# Patient Record
Sex: FEMALE | Race: WHITE | NOT HISPANIC OR LATINO | Employment: FULL TIME | URBAN - METROPOLITAN AREA
[De-identification: names, ages, dates, MRNs, and addresses within clinical notes are randomized per-mention and may not be internally consistent; named-entity substitution may affect disease eponyms.]

---

## 2017-08-31 ENCOUNTER — OFFICE VISIT (OUTPATIENT)
Dept: INTERNAL MEDICINE | Facility: CLINIC | Age: 52
End: 2017-08-31
Payer: COMMERCIAL

## 2017-08-31 ENCOUNTER — TELEPHONE (OUTPATIENT)
Dept: INTERNAL MEDICINE | Facility: CLINIC | Age: 52
End: 2017-08-31

## 2017-08-31 VITALS
HEART RATE: 82 BPM | BODY MASS INDEX: 30.28 KG/M2 | WEIGHT: 170.88 LBS | SYSTOLIC BLOOD PRESSURE: 123 MMHG | TEMPERATURE: 99 F | DIASTOLIC BLOOD PRESSURE: 81 MMHG | HEIGHT: 63 IN | RESPIRATION RATE: 16 BRPM

## 2017-08-31 DIAGNOSIS — Z00.00 ANNUAL PHYSICAL EXAM: Primary | ICD-10-CM

## 2017-08-31 DIAGNOSIS — J32.9 VIRAL SINUSITIS: ICD-10-CM

## 2017-08-31 DIAGNOSIS — E03.4 HYPOTHYROIDISM DUE TO ACQUIRED ATROPHY OF THYROID: Primary | ICD-10-CM

## 2017-08-31 DIAGNOSIS — B97.89 VIRAL SINUSITIS: ICD-10-CM

## 2017-08-31 PROCEDURE — 96372 THER/PROPH/DIAG INJ SC/IM: CPT | Mod: S$GLB,,, | Performed by: INTERNAL MEDICINE

## 2017-08-31 PROCEDURE — 99214 OFFICE O/P EST MOD 30 MIN: CPT | Mod: 25,S$GLB,, | Performed by: INTERNAL MEDICINE

## 2017-08-31 PROCEDURE — 99999 PR PBB SHADOW E&M-EST. PATIENT-LVL III: CPT | Mod: PBBFAC,,, | Performed by: INTERNAL MEDICINE

## 2017-08-31 PROCEDURE — 3074F SYST BP LT 130 MM HG: CPT | Mod: S$GLB,,, | Performed by: INTERNAL MEDICINE

## 2017-08-31 PROCEDURE — 3079F DIAST BP 80-89 MM HG: CPT | Mod: S$GLB,,, | Performed by: INTERNAL MEDICINE

## 2017-08-31 PROCEDURE — 3008F BODY MASS INDEX DOCD: CPT | Mod: S$GLB,,, | Performed by: INTERNAL MEDICINE

## 2017-08-31 RX ORDER — TRIAMCINOLONE ACETONIDE 40 MG/ML
40 INJECTION, SUSPENSION INTRA-ARTICULAR; INTRAMUSCULAR
Status: COMPLETED | OUTPATIENT
Start: 2017-08-31 | End: 2017-08-31

## 2017-08-31 RX ORDER — LEVOTHYROXINE SODIUM 50 UG/1
50 TABLET ORAL DAILY
Qty: 90 TABLET | Refills: 3 | Status: SHIPPED | OUTPATIENT
Start: 2017-08-31 | End: 2017-11-09 | Stop reason: SDUPTHER

## 2017-08-31 RX ORDER — METHYLPREDNISOLONE 4 MG/1
TABLET ORAL
Qty: 1 PACKAGE | Refills: 0 | Status: SHIPPED | OUTPATIENT
Start: 2017-08-31 | End: 2017-11-09 | Stop reason: ALTCHOICE

## 2017-08-31 RX ORDER — FLUTICASONE PROPIONATE 50 MCG
2 SPRAY, SUSPENSION (ML) NASAL DAILY
Qty: 16 G | Refills: 2 | Status: SHIPPED | OUTPATIENT
Start: 2017-08-31 | End: 2017-09-30

## 2017-08-31 RX ADMIN — TRIAMCINOLONE ACETONIDE 40 MG: 40 INJECTION, SUSPENSION INTRA-ARTICULAR; INTRAMUSCULAR at 02:08

## 2017-08-31 NOTE — PROGRESS NOTES
Subjective:       Patient ID: Tanesha Burns is a 52 y.o. female.    Chief Complaint: Medication Refill    HPI   Pt with hypothyroidism is here for f/u. She is requesting refills of her synthroid.    Pt also c/o 2 weeks of persistent sinus congestion, post nasal drip, sore throat, fevers/chills initially. Minimal relief with OTC medications.   Review of Systems   Constitutional: Positive for chills and fever. Negative for activity change, appetite change, diaphoresis, fatigue and unexpected weight change.   HENT: Positive for congestion, postnasal drip, rhinorrhea and sinus pressure. Negative for sneezing, sore throat, trouble swallowing and voice change.    Respiratory: Negative for cough, shortness of breath and wheezing.    Cardiovascular: Negative for chest pain, palpitations and leg swelling.   Gastrointestinal: Negative for abdominal pain, blood in stool, constipation, diarrhea, nausea and vomiting.   Genitourinary: Negative for dysuria.   Musculoskeletal: Negative for arthralgias and myalgias.   Skin: Negative for rash and wound.   Allergic/Immunologic: Negative for environmental allergies and food allergies.   Hematological: Negative for adenopathy. Does not bruise/bleed easily.       Objective:      Physical Exam   Constitutional: She is oriented to person, place, and time. She appears well-developed and well-nourished. No distress.   HENT:   Head: Normocephalic and atraumatic.   Right Ear: External ear normal.   Left Ear: External ear normal.   Nose: Mucosal edema and rhinorrhea present.   Mouth/Throat: Oropharynx is clear and moist. No oropharyngeal exudate.   Eyes: Conjunctivae and EOM are normal. Pupils are equal, round, and reactive to light. Right eye exhibits no discharge. Left eye exhibits no discharge. No scleral icterus.   Neck: Neck supple. No JVD present.   Cardiovascular: Normal rate, regular rhythm, normal heart sounds and intact distal pulses.    Pulmonary/Chest: Effort normal and breath  sounds normal. No respiratory distress. She has no wheezes. She has no rales.   Abdominal: Soft. Bowel sounds are normal. There is no tenderness.   Musculoskeletal: She exhibits no edema.   Lymphadenopathy:     She has no cervical adenopathy.   Neurological: She is alert and oriented to person, place, and time.   Skin: Skin is warm and dry. No rash noted. She is not diaphoretic. No pallor.       Assessment:       1. Hypothyroidism due to acquired atrophy of thyroid    2. Viral sinusitis        Plan:    1. Stable, refill Synthroid    2. Rx Medrol Pack/Flonase, Kenalog 40 mg IM and continue antihistamine

## 2017-08-31 NOTE — TELEPHONE ENCOUNTER
----- Message from Mari Lowe sent at 8/31/2017 11:03 AM CDT -----  Contact: self/  Doctor appointment and lab have been scheduled.  Please link lab orders to the lab appointment.  Date of doctor appointment:  11/9/17  Physical or EP:  phys  Date of lab appointment:  11/2/17  Comments:

## 2017-11-02 ENCOUNTER — LAB VISIT (OUTPATIENT)
Dept: LAB | Facility: HOSPITAL | Age: 52
End: 2017-11-02
Attending: INTERNAL MEDICINE
Payer: COMMERCIAL

## 2017-11-02 DIAGNOSIS — Z00.00 ANNUAL PHYSICAL EXAM: ICD-10-CM

## 2017-11-02 LAB
ALBUMIN SERPL BCP-MCNC: 3.5 G/DL
ALP SERPL-CCNC: 92 U/L
ALT SERPL W/O P-5'-P-CCNC: 8 U/L
ANION GAP SERPL CALC-SCNC: 9 MMOL/L
AST SERPL-CCNC: 12 U/L
BASOPHILS # BLD AUTO: 0.01 K/UL
BASOPHILS NFR BLD: 0.1 %
BILIRUB SERPL-MCNC: 0.7 MG/DL
BUN SERPL-MCNC: 9 MG/DL
CALCIUM SERPL-MCNC: 9.3 MG/DL
CHLORIDE SERPL-SCNC: 107 MMOL/L
CHOLEST SERPL-MCNC: 159 MG/DL
CHOLEST/HDLC SERPL: 3.5 {RATIO}
CO2 SERPL-SCNC: 25 MMOL/L
CREAT SERPL-MCNC: 0.9 MG/DL
DIFFERENTIAL METHOD: ABNORMAL
EOSINOPHIL # BLD AUTO: 0.2 K/UL
EOSINOPHIL NFR BLD: 2.5 %
ERYTHROCYTE [DISTWIDTH] IN BLOOD BY AUTOMATED COUNT: 12.6 %
EST. GFR  (AFRICAN AMERICAN): >60 ML/MIN/1.73 M^2
EST. GFR  (NON AFRICAN AMERICAN): >60 ML/MIN/1.73 M^2
GLUCOSE SERPL-MCNC: 100 MG/DL
HCT VFR BLD AUTO: 39 %
HDLC SERPL-MCNC: 46 MG/DL
HDLC SERPL: 28.9 %
HGB BLD-MCNC: 13.2 G/DL
IMM GRANULOCYTES # BLD AUTO: 0.04 K/UL
IMM GRANULOCYTES NFR BLD AUTO: 0.6 %
LDLC SERPL CALC-MCNC: 99.8 MG/DL
LYMPHOCYTES # BLD AUTO: 1.5 K/UL
LYMPHOCYTES NFR BLD: 21.5 %
MCH RBC QN AUTO: 31.5 PG
MCHC RBC AUTO-ENTMCNC: 33.8 G/DL
MCV RBC AUTO: 93 FL
MONOCYTES # BLD AUTO: 0.6 K/UL
MONOCYTES NFR BLD: 8.4 %
NEUTROPHILS # BLD AUTO: 4.6 K/UL
NEUTROPHILS NFR BLD: 66.9 %
NONHDLC SERPL-MCNC: 113 MG/DL
NRBC BLD-RTO: 0 /100 WBC
PLATELET # BLD AUTO: 173 K/UL
PMV BLD AUTO: 11.7 FL
POTASSIUM SERPL-SCNC: 3.9 MMOL/L
PROT SERPL-MCNC: 6.8 G/DL
RBC # BLD AUTO: 4.19 M/UL
SODIUM SERPL-SCNC: 141 MMOL/L
T4 FREE SERPL-MCNC: 1.04 NG/DL
TRIGL SERPL-MCNC: 66 MG/DL
TSH SERPL DL<=0.005 MIU/L-ACNC: 2.52 UIU/ML
WBC # BLD AUTO: 6.93 K/UL

## 2017-11-02 PROCEDURE — 80061 LIPID PANEL: CPT

## 2017-11-02 PROCEDURE — 80053 COMPREHEN METABOLIC PANEL: CPT

## 2017-11-02 PROCEDURE — 84439 ASSAY OF FREE THYROXINE: CPT

## 2017-11-02 PROCEDURE — 85025 COMPLETE CBC W/AUTO DIFF WBC: CPT

## 2017-11-02 PROCEDURE — 36415 COLL VENOUS BLD VENIPUNCTURE: CPT | Mod: PO

## 2017-11-02 PROCEDURE — 84443 ASSAY THYROID STIM HORMONE: CPT

## 2017-11-09 ENCOUNTER — OFFICE VISIT (OUTPATIENT)
Dept: INTERNAL MEDICINE | Facility: CLINIC | Age: 52
End: 2017-11-09
Payer: COMMERCIAL

## 2017-11-09 VITALS
HEART RATE: 64 BPM | TEMPERATURE: 98 F | HEIGHT: 63 IN | BODY MASS INDEX: 28.9 KG/M2 | SYSTOLIC BLOOD PRESSURE: 118 MMHG | RESPIRATION RATE: 16 BRPM | DIASTOLIC BLOOD PRESSURE: 70 MMHG | WEIGHT: 163.13 LBS

## 2017-11-09 DIAGNOSIS — K63.5 POLYP OF COLON, UNSPECIFIED PART OF COLON, UNSPECIFIED TYPE: ICD-10-CM

## 2017-11-09 DIAGNOSIS — E03.4 HYPOTHYROIDISM DUE TO ACQUIRED ATROPHY OF THYROID: ICD-10-CM

## 2017-11-09 DIAGNOSIS — Z00.00 ANNUAL PHYSICAL EXAM: Primary | ICD-10-CM

## 2017-11-09 PROCEDURE — 99396 PREV VISIT EST AGE 40-64: CPT | Mod: S$GLB,,, | Performed by: INTERNAL MEDICINE

## 2017-11-09 PROCEDURE — 99999 PR PBB SHADOW E&M-EST. PATIENT-LVL III: CPT | Mod: PBBFAC,,, | Performed by: INTERNAL MEDICINE

## 2017-11-09 RX ORDER — LEVOTHYROXINE SODIUM 50 UG/1
50 TABLET ORAL DAILY
Qty: 90 TABLET | Refills: 3 | Status: SHIPPED | OUTPATIENT
Start: 2017-11-09 | End: 2017-11-09 | Stop reason: SDUPTHER

## 2017-11-09 RX ORDER — LEVOTHYROXINE SODIUM 50 UG/1
50 TABLET ORAL DAILY
Qty: 90 TABLET | Refills: 3 | Status: SHIPPED | OUTPATIENT
Start: 2017-11-09 | End: 2018-03-24 | Stop reason: SDUPTHER

## 2017-11-09 NOTE — PROGRESS NOTES
Subjective:       Patient ID: Tanesha Burns is a 52 y.o. female.    Chief Complaint: Annual Exam (with labs to review)    HPI   52 y.o. Female here for annual exam.      Cholesterol: (normal)  Vaccines: Influenza (declined); Tetanus (declined)  Eye exam: 2016  Mammogram: 2/16  Gyn exam: 2016  Colonoscopy: 11/13- repeat in 2018     Exercise: no  Diet: regular     Past Medical History:    Hysterectomy     Abnormal Pap smear                              1983            Comment:cryo    Colon polyps                                                  Encounter for blood transfusion                               History of abnormal Pap smear                                   Comment:s/p cryo at age 16    Hypothyroid                                                   Psoriasis                                                   Past Surgical History:    TEMPOROMANDIBULAR JOINT SURGERY                                BUNIONECTOMY                                                     Comment:bilat    RETINAL LASER PROCEDURE                                        TUBAL LIGATION                                   2/09          CRYOTHERAPY                                                    FOREARM FRACTURE SURGERY                                       WRIST SURGERY                                   Left               HYSTERECTOMY                                                 Social History    Marital status:             Spouse name:                       Years of education:                 Number of children: 2              Occupational History  Occupation          Employer            Comment                                             Social History Main Topics    Smoking status: Never Smoker                                                                    Smokeless status: Not on file                       Alcohol use: No              Drug use: No              Sexual activity: Yes               Partners  with: Male     No Known Allergies  Ms. Burns does not currently have medications on file.  Review of Systems   Constitutional: Negative for activity change, appetite change, chills, diaphoresis, fatigue, fever and unexpected weight change.   HENT: Negative for congestion, mouth sores, postnasal drip, rhinorrhea, sinus pressure, sneezing, sore throat, trouble swallowing and voice change.    Eyes: Negative for pain, discharge and visual disturbance.   Respiratory: Negative for cough, shortness of breath and wheezing.    Cardiovascular: Negative for chest pain, palpitations and leg swelling.   Gastrointestinal: Negative for abdominal pain, blood in stool, constipation, diarrhea, nausea and vomiting.   Endocrine: Negative for cold intolerance and heat intolerance.   Genitourinary: Negative for difficulty urinating, dysuria, frequency, hematuria and urgency.   Musculoskeletal: Negative for arthralgias and myalgias.   Skin: Negative for rash and wound.   Allergic/Immunologic: Negative for environmental allergies and food allergies.   Neurological: Negative for dizziness, tremors, seizures, syncope, weakness, light-headedness and headaches.   Hematological: Negative for adenopathy. Does not bruise/bleed easily.   Psychiatric/Behavioral: Negative for confusion and sleep disturbance. The patient is not nervous/anxious.        Objective:      Physical Exam   Constitutional: She is oriented to person, place, and time. She appears well-developed and well-nourished. No distress.   HENT:   Head: Normocephalic and atraumatic.   Right Ear: External ear normal.   Left Ear: External ear normal.   Nose: Nose normal.   Mouth/Throat: Oropharynx is clear and moist. No oropharyngeal exudate.   Eyes: Conjunctivae and EOM are normal. Pupils are equal, round, and reactive to light. Right eye exhibits no discharge. Left eye exhibits no discharge. No scleral icterus.   Neck: Neck supple. No JVD present. No thyromegaly present.    Cardiovascular: Normal rate, regular rhythm, normal heart sounds and intact distal pulses.    No murmur heard.  Pulmonary/Chest: Effort normal and breath sounds normal. No respiratory distress. She has no wheezes. She has no rales. She exhibits no tenderness.   Abdominal: Soft. Bowel sounds are normal. She exhibits no distension. There is no tenderness. There is no guarding.   Musculoskeletal: She exhibits no edema.   Lymphadenopathy:     She has no cervical adenopathy.   Neurological: She is alert and oriented to person, place, and time. No cranial nerve deficit. Coordination normal.   Skin: Skin is warm and dry. No rash noted. She is not diaphoretic. No pallor.   Psychiatric: She has a normal mood and affect. Judgment normal.   Nursing note and vitals reviewed.      Assessment:       1. Annual physical exam    2. Hypothyroidism due to acquired atrophy of thyroid    3. Polyp of colon, unspecified part of colon, unspecified type        Plan:    1. Blood work reviewed with pt       Vaccines: Influenza (declined); Tetanus (declined)       Eye exam: 2016       Mammogram: 2/16       Gyn exam: 2016       Colonoscopy: 11/13- repeat in 2018   2. Hypothyroidism- controlled on Synthroid   3. Colon polyps- needs repeat Colonoscopy in 2018   4. F/u in 1 yr or PRN

## 2017-12-11 ENCOUNTER — TELEPHONE (OUTPATIENT)
Dept: OBSTETRICS AND GYNECOLOGY | Facility: CLINIC | Age: 52
End: 2017-12-11

## 2017-12-11 DIAGNOSIS — Z12.31 VISIT FOR SCREENING MAMMOGRAM: Primary | ICD-10-CM

## 2017-12-27 ENCOUNTER — HOSPITAL ENCOUNTER (OUTPATIENT)
Dept: RADIOLOGY | Facility: HOSPITAL | Age: 52
Discharge: HOME | End: 2017-12-27
Attending: OBSTETRICS & GYNECOLOGY
Payer: COMMERCIAL

## 2017-12-27 DIAGNOSIS — Z12.31 VISIT FOR SCREENING MAMMOGRAM: ICD-10-CM

## 2017-12-27 PROCEDURE — 77063 BREAST TOMOSYNTHESIS BI: CPT | Mod: 26,,, | Performed by: RADIOLOGY

## 2017-12-27 PROCEDURE — 77067 SCR MAMMO BI INCL CAD: CPT | Mod: 26,,, | Performed by: RADIOLOGY

## 2017-12-27 PROCEDURE — 77067 SCR MAMMO BI INCL CAD: CPT | Mod: TC,PO

## 2018-03-24 DIAGNOSIS — E03.4 HYPOTHYROIDISM DUE TO ACQUIRED ATROPHY OF THYROID: ICD-10-CM

## 2018-03-26 RX ORDER — LEVOTHYROXINE SODIUM 50 UG/1
TABLET ORAL
Qty: 90 TABLET | Refills: 3 | Status: SHIPPED | OUTPATIENT
Start: 2018-03-26 | End: 2019-03-11 | Stop reason: SDUPTHER

## 2018-07-26 ENCOUNTER — TELEPHONE (OUTPATIENT)
Dept: INTERNAL MEDICINE | Facility: CLINIC | Age: 53
End: 2018-07-26

## 2018-07-26 DIAGNOSIS — Z80.0 FAMILY HISTORY OF COLON CANCER: ICD-10-CM

## 2018-07-26 DIAGNOSIS — Z12.11 COLON CANCER SCREENING: Primary | ICD-10-CM

## 2018-07-26 NOTE — TELEPHONE ENCOUNTER
Spoke with pt re: Colonoscopy order placed. Now it takes about 2 weeks for them to call her to schedule the appt. Pt then can tell them what date she wants.    Pt verbalized understanding

## 2018-07-26 NOTE — TELEPHONE ENCOUNTER
----- Message from Mari Lowe sent at 7/26/2018 11:27 AM CDT -----  Contact: self/ [phone]  Patient would like a colonoscopy. She has one every five years due to family history. Please call and advise.    Patient would like to have this done 11/9/2018.

## 2018-09-28 ENCOUNTER — PATIENT MESSAGE (OUTPATIENT)
Dept: ENDOSCOPY | Facility: HOSPITAL | Age: 53
End: 2018-09-28

## 2018-09-28 DIAGNOSIS — Z12.11 SPECIAL SCREENING FOR MALIGNANT NEOPLASMS, COLON: Primary | ICD-10-CM

## 2018-09-28 RX ORDER — SODIUM, POTASSIUM,MAG SULFATES 17.5-3.13G
SOLUTION, RECONSTITUTED, ORAL ORAL
Qty: 354 ML | Refills: 0 | Status: SHIPPED | OUTPATIENT
Start: 2018-09-28 | End: 2019-04-10 | Stop reason: ALTCHOICE

## 2018-12-07 ENCOUNTER — ANESTHESIA (OUTPATIENT)
Dept: ENDOSCOPY | Facility: HOSPITAL | Age: 53
End: 2018-12-07
Payer: COMMERCIAL

## 2018-12-07 ENCOUNTER — HOSPITAL ENCOUNTER (OUTPATIENT)
Facility: HOSPITAL | Age: 53
Discharge: HOME | End: 2018-12-07
Attending: INTERNAL MEDICINE | Admitting: INTERNAL MEDICINE
Payer: COMMERCIAL

## 2018-12-07 ENCOUNTER — ANESTHESIA EVENT (OUTPATIENT)
Dept: ENDOSCOPY | Facility: HOSPITAL | Age: 53
End: 2018-12-07
Payer: COMMERCIAL

## 2018-12-07 VITALS
RESPIRATION RATE: 18 BRPM | HEART RATE: 76 BPM | TEMPERATURE: 98 F | SYSTOLIC BLOOD PRESSURE: 112 MMHG | DIASTOLIC BLOOD PRESSURE: 66 MMHG | OXYGEN SATURATION: 99 %

## 2018-12-07 DIAGNOSIS — K63.5 COLON POLYPS: ICD-10-CM

## 2018-12-07 DIAGNOSIS — K63.5 POLYP OF COLON, UNSPECIFIED PART OF COLON, UNSPECIFIED TYPE: Primary | ICD-10-CM

## 2018-12-07 PROCEDURE — E9220 PRA ENDO ANESTHESIA: HCPCS | Mod: 33,,, | Performed by: NURSE ANESTHETIST, CERTIFIED REGISTERED

## 2018-12-07 PROCEDURE — 45380 COLONOSCOPY AND BIOPSY: CPT | Mod: 33,,, | Performed by: INTERNAL MEDICINE

## 2018-12-07 PROCEDURE — 27201012 HC FORCEPS, HOT/COLD, DISP: Performed by: INTERNAL MEDICINE

## 2018-12-07 PROCEDURE — 45380 COLONOSCOPY AND BIOPSY: CPT | Performed by: INTERNAL MEDICINE

## 2018-12-07 PROCEDURE — 88305 TISSUE EXAM BY PATHOLOGIST: CPT | Performed by: PATHOLOGY

## 2018-12-07 PROCEDURE — 63600175 PHARM REV CODE 636 W HCPCS: Performed by: NURSE ANESTHETIST, CERTIFIED REGISTERED

## 2018-12-07 PROCEDURE — 37000009 HC ANESTHESIA EA ADD 15 MINS: Performed by: INTERNAL MEDICINE

## 2018-12-07 PROCEDURE — 88305 TISSUE EXAM BY PATHOLOGIST: CPT | Mod: 26,,, | Performed by: PATHOLOGY

## 2018-12-07 PROCEDURE — 37000008 HC ANESTHESIA 1ST 15 MINUTES: Performed by: INTERNAL MEDICINE

## 2018-12-07 PROCEDURE — 25000003 PHARM REV CODE 250: Performed by: INTERNAL MEDICINE

## 2018-12-07 RX ORDER — SODIUM CHLORIDE 9 MG/ML
INJECTION, SOLUTION INTRAVENOUS CONTINUOUS
Status: DISCONTINUED | OUTPATIENT
Start: 2018-12-07 | End: 2018-12-07 | Stop reason: HOSPADM

## 2018-12-07 RX ORDER — LIDOCAINE HCL/PF 100 MG/5ML
SYRINGE (ML) INTRAVENOUS
Status: DISCONTINUED | OUTPATIENT
Start: 2018-12-07 | End: 2018-12-07

## 2018-12-07 RX ORDER — PROPOFOL 10 MG/ML
INJECTION, EMULSION INTRAVENOUS CONTINUOUS PRN
Status: DISCONTINUED | OUTPATIENT
Start: 2018-12-07 | End: 2018-12-07

## 2018-12-07 RX ORDER — PROPOFOL 10 MG/ML
INJECTION, EMULSION INTRAVENOUS
Status: DISCONTINUED | OUTPATIENT
Start: 2018-12-07 | End: 2018-12-07

## 2018-12-07 RX ORDER — SODIUM CHLORIDE 0.9 % (FLUSH) 0.9 %
3 SYRINGE (ML) INJECTION
Status: DISCONTINUED | OUTPATIENT
Start: 2018-12-07 | End: 2018-12-07 | Stop reason: HOSPADM

## 2018-12-07 RX ADMIN — PROPOFOL 80 MG: 10 INJECTION, EMULSION INTRAVENOUS at 07:12

## 2018-12-07 RX ADMIN — LIDOCAINE HYDROCHLORIDE 50 MG: 20 INJECTION, SOLUTION INTRAVENOUS at 07:12

## 2018-12-07 RX ADMIN — PROPOFOL 50 MG: 10 INJECTION, EMULSION INTRAVENOUS at 07:12

## 2018-12-07 RX ADMIN — SODIUM CHLORIDE: 0.9 INJECTION, SOLUTION INTRAVENOUS at 07:12

## 2018-12-07 RX ADMIN — PROPOFOL 200 MCG/KG/MIN: 10 INJECTION, EMULSION INTRAVENOUS at 07:12

## 2018-12-07 NOTE — DISCHARGE INSTRUCTIONS
Colonoscopy     A camera attached to a flexible tube with a viewing lens is used to take video pictures.     Colonoscopy is a test to view the inside of your lower digestive tract (colon and rectum). Sometimes it can show the last part of the small intestine (ileum). During the test, small pieces of tissue may be removed for testing. This is called a biopsy. Small growths, such as polyps, may also be removed.   Why is colonoscopy done?  The test is done to help look for colon cancer. And it can help find the source of abdominal pain, bleeding, and changes in bowel habits. It may be needed once a year, depending on factors such as your:  · Age  · Health history  · Family health history  · Symptoms  · Results from any prior colonoscopy  Risks and possible complications  These include:  · Bleeding               · A puncture or tear in the colon   · Risks of anesthesia  · A cancer lesion not being seen  Getting ready   To prepare for the test:  · Talk with your healthcare provider about the risks of the test (see below). Also ask your healthcare provider about alternatives to the test.  · Tell your healthcare provider about any medicines you take. Also tell him or her about any health conditions you may have.  · Make sure your rectum and colon are empty for the test. Follow the diet and bowel prep instructions exactly. If you dont, the test may need to be rescheduled.  · Plan for a friend or family member to drive you home after the test.     Colonoscopy provides an inside view of the entire colon.     You may discuss the results with your doctor right away or at a future visit.  During the test   The test is usually done in the hospital on an outpatient basis. This means you go home the same day. The procedure takes about 30 minutes. During that time:  · You are given relaxing (sedating) medicine through an IV line. You may be drowsy, or fully asleep.  · The healthcare provider will first give you a physical exam to  check for anal and rectal problems.  · Then the anus is lubricated and the scope inserted.  · If you are awake, you may have a feeling similar to needing to have a bowel movement. You may also feel pressure as air is pumped into the colon. Its OK to pass gas during the procedure.  · Biopsy, polyp removal, or other treatments may be done during the test.  After the test   You may have gas right after the test. It can help to try to pass it to help prevent later bloating. Your healthcare provider may discuss the results with you right away. Or you may need to schedule a follow-up visit to talk about the results. After the test, you can go back to your normal eating and other activities. You may be tired from the sedation and need to rest for a few hours.  Date Last Reviewed: 11/1/2016 © 2000-2017 [employer]. [address] [place] [address], [place] [address]. All rights reserved. This information is not intended as a substitute for professional medical care. Always follow your healthcare professional's instructions.

## 2018-12-07 NOTE — H&P
Short Stay Endoscopy History and Physical    PCP - Pedro Carrera DO  Referring Physician - Pedro Carrera DO  [address] [facility]  [place] [id number]    Procedure - Colonoscopy  ASA - per anesthesia  Mallampati - per anesthesia  History of Anesthesia problems - see anesthesia note  Family history Anesthesia problems -  see anesthesia note  Plan of anesthesia - as per anesthesia    HPI  53 y.o. female    Reason for procedure:  previous polyps, family history of CRC, father had CRC at age 60s.     Abdominal/epigastric pain: No  Reflux: No   Dysphagia: No  Change in bowel habits: No    ROS:  Constitutional: No fevers, chills  CV: No chest pain  Pulm: No shortness of breath  GI: see HPI    Medical History:  has a past medical history of Abnormal Pap smear (1983), Colon polyps, Encounter for blood transfusion, History of abnormal Pap smear, Hypothyroid, and Psoriasis.    Surgical History:  has a past surgical history that includes Temporomandibular joint surgery; Bunionectomy; Retinal laser procedure; Tubal ligation (2/09); Cryotherapy; Forearm fracture surgery; Wrist surgery (Left); Hysterectomy; LAPAROSCOPIC - UTEROSACRAL SUSPENSION (N/A, 3/7/2016); CYSTOSCOPY (N/A, 3/7/2016); SALPINGECTOMY-ROBOTIC ASSISTED (N/A, 3/7/2016); EXCISION-SKIN--L labial bulge (Left, 3/7/2016); ROBOTIC ASSISTED LAPAROSCOPIC HYSTERECTOMY (N/A, 3/7/2016); PERINEORRHAPHY POSTERIOR (3/7/2016); ROBOTIC ASSISTED LAPAROSCOPIC HYSTERECTOMY (N/A, 3/7/2016); and COLONOSCOPY (N/A, 11/25/2013).    Family History: family history includes Breast cancer (age of onset: 90) in her maternal grandmother; Cancer (age of onset: 65) in her father; Colon cancer in her father; Coronary artery disease (age of onset: 60) in her father; Diabetes in her maternal grandfather; Hypertension in her father; Liver cancer in her father; Prostate cancer in her father.    Social History:  reports that  has never smoked. she has never used smokeless tobacco.  She reports that she does not drink alcohol or use drugs.    Review of patient's allergies indicates:  No Known Allergies    Medications:   Medications Prior to Admission   Medication Sig Dispense Refill Last Dose    SYNTHROID 50 mcg tablet TAKE 1 TABLET DAILY 90 tablet 3 12/6/2018 at Unknown time    sodium,potassium,mag sulfates (SUPREP BOWEL PREP KIT) 17.5-3.13-1.6 gram SolR As directed. 354 mL 0        Physical Exam:    Vital Signs: There were no vitals filed for this visit.    General Appearance: Well appearing in no acute distress  Lungs: No respiratory distress.   Heart:  Regular rate, S1, S2 normal.  Abdomen: Soft, non tender, non distended with normal bowel sounds, no masses    Labs:  Lab Results   Component Value Date    WBC 6.93 11/02/2017    HGB 13.2 11/02/2017    HCT 39.0 11/02/2017    MCV 93 11/02/2017     11/02/2017     Lab Results   Component Value Date    INR 1.0 03/03/2016     No results found for: IRON, FERRITIN, TIBC, FESATURATED  Lab Results   Component Value Date     11/02/2017    K 3.9 11/02/2017     11/02/2017    CO2 25 11/02/2017    BUN 9 11/02/2017    CREATININE 0.9 11/02/2017       I have explained the risks and benefits of this endoscopic procedure to the patient/family including but not limited to missed polyp, missed CRC, bleeding, inflammation, infection, perforation, hypoxia/respiratory failure, and death.       Melany Cho MD  Gastroenterology & Hepatology Fellow  Pager: [phone]

## 2018-12-07 NOTE — TRANSFER OF CARE
Anesthesia Transfer of Care Note    Patient: Tanesha Burns    Procedure(s) Performed: Procedure(s) (LRB):  COLONOSCOPY (N/A)    Patient location: GI    Anesthesia Type: general    Transport from OR: Transported from OR on room air with adequate spontaneous ventilation    Post pain: adequate analgesia    Post assessment: no apparent anesthetic complications and tolerated procedure well    Post vital signs: stable    Level of consciousness: awake    Nausea/Vomiting: no nausea/vomiting    Complications: none    Transfer of care protocol was followed      Last vitals:   Visit Vitals  /60 (BP Location: Left arm, Patient Position: Lying)   Pulse 77   Temp 36.6 °C (97.8 °F) (Temporal)   Resp 18   LMP 03/29/2016   SpO2 97%   Breastfeeding? No

## 2018-12-07 NOTE — [employer] PATIENT INSTRUCTIONS
Discharge Summary/Instructions after an Endoscopic Procedure  Patient Name: Tanesha Burns  Patient MRN: [id number]  Patient YOB: 1965  Friday, December 07, 2018  Evelio García MD  RESTRICTIONS:  During your procedure today, you received medications for sedation.  These   medications may affect your judgment, balance and coordination.  Therefore,   for 24 hours, you have the following restrictions:   - DO NOT drive a car, operate machinery, make legal/financial decisions,   sign important papers or drink alcohol.    ACTIVITY:  Today: no heavy lifting, straining or running due to procedural   sedation/anesthesia.  The following day: return to full activity including work.  DIET:  Eat and drink normally unless instructed otherwise.     TREATMENT FOR COMMON SIDE EFFECTS:  - Mild abdominal pain, nausea, belching, bloating or excessive gas:  rest,   eat lightly and use a heating pad.  - Sore Throat: treat with throat lozenges and/or gargle with warm salt   water.  - Because air was used during the procedure, expelling large amounts of air   from your rectum or belching is normal.  - If a bowel prep was taken, you may not have a bowel movement for 1-3 days.    This is normal.  SYMPTOMS TO WATCH FOR AND REPORT TO YOUR PHYSICIAN:  1. Abdominal pain or bloating, other than gas cramps.  2. Chest pain.  3. Back pain.  4. Signs of infection such as: chills or fever occurring within 24 hours   after the procedure.  5. Rectal bleeding, which would show as bright red, maroon, or black stools.   (A tablespoon of blood from the rectum is not serious, especially if   hemorrhoids are present.)  6. Vomiting.  7. Weakness or dizziness.  GO DIRECTLY TO THE NEAREST EMERGENCY ROOM IF YOU HAVE ANY OF THE FOLLOWING:      Difficulty breathing              Chills and/or fever over 101 F   Persistent vomiting and/or vomiting blood   Severe abdominal pain   Severe chest pain   Black, tarry stools   Bleeding- more than one  tablespoon   Any other symptom or condition that you feel may need urgent attention  Your doctor recommends these additional instructions:  If any biopsies were taken, your doctors clinic will contact you in 1 to 2   weeks with any results.  - Patient has a contact number available for emergencies.  The signs and   symptoms of potential delayed complications were discussed with the   patient.  Return to normal activities tomorrow.  Written discharge   instructions were provided to the patient.   - Discharge patient to home.   - Await pathology results.   - Repeat colonoscopy in 5 years for surveillance.   - The findings and recommendations were discussed with the designated   responsible adult.  For questions, problems or results please call your physician - Evelio García MD at Work:  [phone].  [facility], EMERGENCY ROOM PHONE NUMBER: [phone]  IF A COMPLICATION OR EMERGENCY SITUATION ARISES AND YOU ARE UNABLE TO REACH   YOUR PHYSICIAN - GO DIRECTLY TO THE EMERGENCY ROOM.  Evelio García MD  12/7/2018 7:59:37 AM  This report has been verified and signed electronically.  [employer]

## 2018-12-07 NOTE — ANESTHESIA PREPROCEDURE EVALUATION
12/07/2018  Tanesha Burns is a 53 y.o., female.    Anesthesia Evaluation    I have reviewed the Patient Summary Reports.    I have reviewed the Nursing Notes.   I have reviewed the Medications.     Review of Systems  Anesthesia Hx:  No problems with previous Anesthesia   Denies Personal Hx of Anesthesia complications.   Hematology/Oncology:  Hematology Normal   Oncology Normal     EENT/Dental:EENT/Dental Normal   Cardiovascular:  Cardiovascular Normal Exercise tolerance: good  ECG has been reviewed.    Pulmonary:  Pulmonary Normal    Renal/:  Renal/ Normal     Hepatic/GI:  Hepatic/GI Normal Bowel Prep.    Musculoskeletal:  Musculoskeletal Normal    Neurological:  Neurology Normal    Endocrine:   Hypothyroidism    Dermatological:  Skin Normal    Psych:  Psychiatric Normal           Physical Exam  General:  Well nourished    Airway/Jaw/Neck:  Airway Findings: Mouth Opening: Normal Mallampati: I  TM Distance: Normal, at least 6 cm  Jaw/Neck Findings:  Neck ROM: Normal ROM      Dental:  Dental Findings: In tact   Chest/Lungs:  Chest/Lungs Findings: Clear to auscultation, Normal Respiratory Rate     Heart/Vascular:  Heart Findings: Rate: Normal  Rhythm: Regular Rhythm  Sounds: Normal     Abdomen:  Abdomen Findings:  Normal, Soft, Nontender       Mental Status:  Mental Status Findings:  Alert and Oriented, Cooperative         Anesthesia Plan  Type of Anesthesia, risks & benefits discussed:  Anesthesia Type:  general  Patient's Preference:   Intra-op Monitoring Plan: standard ASA monitors  Intra-op Monitoring Plan Comments:   Post Op Pain Control Plan: IV/PO Opioids PRN  Post Op Pain Control Plan Comments:   Induction:   IV  Beta Blocker:  Patient is not currently on a Beta-Blocker (No further documentation required).       Informed Consent: Patient understands risks and agrees with Anesthesia plan.   Questions answered. Anesthesia consent signed with patient.  ASA Score: 1     Day of Surgery Review of History & Physical: I have interviewed and examined the patient. I have reviewed the patient's H&P dated: 12/07/18. There are no significant changes.          Ready For Surgery From Anesthesia Perspective.

## 2018-12-07 NOTE — ANESTHESIA POSTPROCEDURE EVALUATION
Anesthesia Post Evaluation    Patient: Tanesha Burns    Procedure(s) Performed: Procedure(s) (LRB):  COLONOSCOPY (N/A)    Final Anesthesia Type: general  Patient location during evaluation: PACU  Patient participation: Yes- Able to Participate  Level of consciousness: awake and alert and oriented  Post-procedure vital signs: reviewed and stable  Pain management: adequate  Airway patency: patent  PONV status at discharge: No PONV  Anesthetic complications: no      Cardiovascular status: blood pressure returned to baseline  Respiratory status: unassisted, room air and spontaneous ventilation  Hydration status: euvolemic  Follow-up not needed.        Visit Vitals  /66 (BP Location: Left arm, Patient Position: Lying)   Pulse 76   Temp 36.6 °C (97.8 °F) (Temporal)   Resp 18   LMP 03/29/2016   SpO2 99%   Breastfeeding? No       Pain/Lanny Score: Pain Assessment Performed: Yes (12/7/2018  8:31 AM)  Presence of Pain: denies (12/7/2018  8:31 AM)

## 2018-12-14 ENCOUNTER — TELEPHONE (OUTPATIENT)
Dept: ENDOSCOPY | Facility: HOSPITAL | Age: 53
End: 2018-12-14

## 2019-03-11 DIAGNOSIS — E03.4 HYPOTHYROIDISM DUE TO ACQUIRED ATROPHY OF THYROID: ICD-10-CM

## 2019-03-11 RX ORDER — LEVOTHYROXINE SODIUM 50 UG/1
TABLET ORAL
Qty: 90 TABLET | Refills: 0 | Status: SHIPPED | OUTPATIENT
Start: 2019-03-11 | End: 2019-06-08 | Stop reason: SDUPTHER

## 2019-03-11 NOTE — LETTER
March 12, 2019    Tanesha TERRENCE Burns  [address]  [place] [address]             [place] [facility]                                                                                                                                                       [address] [facility]  [place] [address]  Phone: [phone]  Fax: [phone] Dear Ms. Burns:    We recently refilled your levothyroxine. Dr. Carrera says you are due for your Annual visit w/labs for further refills. Please call to schedule @ [phone], phone staff would be happy to assist you.    If you have any questions or concerns, please don't hesitate to call.    Sincerely,    Pedro Carrera DO  cch

## 2019-04-10 ENCOUNTER — OFFICE VISIT (OUTPATIENT)
Dept: INTERNAL MEDICINE | Facility: CLINIC | Age: 54
End: 2019-04-10
Payer: COMMERCIAL

## 2019-04-10 VITALS
BODY MASS INDEX: 31.25 KG/M2 | TEMPERATURE: 98 F | OXYGEN SATURATION: 96 % | HEART RATE: 96 BPM | HEIGHT: 63 IN | RESPIRATION RATE: 18 BRPM | DIASTOLIC BLOOD PRESSURE: 82 MMHG | WEIGHT: 176.38 LBS | SYSTOLIC BLOOD PRESSURE: 120 MMHG

## 2019-04-10 DIAGNOSIS — Z12.31 ENCOUNTER FOR SCREENING MAMMOGRAM FOR BREAST CANCER: ICD-10-CM

## 2019-04-10 DIAGNOSIS — E03.4 HYPOTHYROIDISM DUE TO ACQUIRED ATROPHY OF THYROID: ICD-10-CM

## 2019-04-10 DIAGNOSIS — Z00.00 ANNUAL PHYSICAL EXAM: Primary | ICD-10-CM

## 2019-04-10 DIAGNOSIS — K63.5 POLYP OF COLON, UNSPECIFIED PART OF COLON, UNSPECIFIED TYPE: ICD-10-CM

## 2019-04-10 PROCEDURE — 3074F SYST BP LT 130 MM HG: CPT | Mod: CPTII,S$GLB,, | Performed by: INTERNAL MEDICINE

## 2019-04-10 PROCEDURE — 3079F DIAST BP 80-89 MM HG: CPT | Mod: CPTII,S$GLB,, | Performed by: INTERNAL MEDICINE

## 2019-04-10 PROCEDURE — 3079F PR MOST RECENT DIASTOLIC BLOOD PRESSURE 80-89 MM HG: ICD-10-PCS | Mod: CPTII,S$GLB,, | Performed by: INTERNAL MEDICINE

## 2019-04-10 PROCEDURE — 99999 PR PBB SHADOW E&M-EST. PATIENT-LVL III: ICD-10-PCS | Mod: PBBFAC,,, | Performed by: INTERNAL MEDICINE

## 2019-04-10 PROCEDURE — 99396 PR PREVENTIVE VISIT,EST,40-64: ICD-10-PCS | Mod: S$GLB,,, | Performed by: INTERNAL MEDICINE

## 2019-04-10 PROCEDURE — 3074F PR MOST RECENT SYSTOLIC BLOOD PRESSURE < 130 MM HG: ICD-10-PCS | Mod: CPTII,S$GLB,, | Performed by: INTERNAL MEDICINE

## 2019-04-10 PROCEDURE — 99999 PR PBB SHADOW E&M-EST. PATIENT-LVL III: CPT | Mod: PBBFAC,,, | Performed by: INTERNAL MEDICINE

## 2019-04-10 PROCEDURE — 99396 PREV VISIT EST AGE 40-64: CPT | Mod: S$GLB,,, | Performed by: INTERNAL MEDICINE

## 2019-04-10 NOTE — PROGRESS NOTES
Subjective:       Patient ID: Tanesha Burns is a 54 y.o. female.    Chief Complaint: Annual Exam    HPI   54 y.o. Female here for annual exam.      Vaccines: Influenza (declined); Tetanus (declined)  Eye exam: 2016  Mammogram: 12/17  Gyn exam: 2016  Colonoscopy: 12/18- repeat in 5 yrs     Exercise: no  Diet: regular     Past Medical History:    Hysterectomy     Abnormal Pap smear                              1983            Comment:cryo    Colon polyps                                                  Encounter for blood transfusion                               History of abnormal Pap smear                                   Comment:s/p cryo at age 16    Hypothyroid                                                   Psoriasis                                                   Past Surgical History:    TEMPOROMANDIBULAR JOINT SURGERY                                BUNIONECTOMY                                                     Comment:bilat    RETINAL LASER PROCEDURE                                        TUBAL LIGATION                                   2/09          CRYOTHERAPY                                                    FOREARM FRACTURE SURGERY                                       WRIST SURGERY                                   Left               HYSTERECTOMY                                                 Social History    Marital status:             Spouse name:                       Years of education:                 Number of children: 2              Occupational History  Occupation          Employer            Comment                                             Social History Main Topics    Smoking status: Never Smoker                                                                    Smokeless status: Not on file                       Alcohol use: No              Drug use: No              Sexual activity: Yes               Partners with: Male     No Known Allergies    Review  of Systems   Constitutional: Negative for activity change, appetite change, chills, diaphoresis, fatigue, fever and unexpected weight change.   HENT: Negative for congestion, mouth sores, postnasal drip, rhinorrhea, sinus pressure, sneezing, sore throat, trouble swallowing and voice change.    Eyes: Negative for pain, discharge and visual disturbance.   Respiratory: Negative for cough, shortness of breath and wheezing.    Cardiovascular: Negative for chest pain, palpitations and leg swelling.   Gastrointestinal: Negative for abdominal pain, blood in stool, constipation, diarrhea, nausea and vomiting.   Endocrine: Negative for cold intolerance and heat intolerance.   Genitourinary: Negative for difficulty urinating, dysuria, frequency, hematuria and urgency.   Musculoskeletal: Negative for arthralgias and myalgias.   Skin: Negative for rash and wound.   Allergic/Immunologic: Negative for environmental allergies and food allergies.   Neurological: Negative for dizziness, tremors, seizures, syncope, weakness, light-headedness and headaches.   Hematological: Negative for adenopathy. Does not bruise/bleed easily.   Psychiatric/Behavioral: Negative for confusion and sleep disturbance. The patient is not nervous/anxious.        Objective:      Physical Exam   Constitutional: She is oriented to person, place, and time. She appears well-developed and well-nourished. No distress.   HENT:   Head: Normocephalic and atraumatic.   Right Ear: External ear normal.   Left Ear: External ear normal.   Nose: Nose normal.   Mouth/Throat: Oropharynx is clear and moist. No oropharyngeal exudate.   Eyes: Pupils are equal, round, and reactive to light. Conjunctivae and EOM are normal. Right eye exhibits no discharge. Left eye exhibits no discharge. No scleral icterus.   Neck: Neck supple. No JVD present. No thyromegaly present.   Cardiovascular: Normal rate, regular rhythm, normal heart sounds and intact distal pulses.   No murmur  heard.  Pulmonary/Chest: Effort normal and breath sounds normal. No respiratory distress. She has no wheezes. She has no rales. She exhibits no tenderness.   Abdominal: Soft. Bowel sounds are normal. She exhibits no distension. There is no tenderness. There is no guarding.   Musculoskeletal: She exhibits no edema.   Lymphadenopathy:     She has no cervical adenopathy.   Neurological: She is alert and oriented to person, place, and time. No cranial nerve deficit. Coordination normal.   Skin: Skin is warm and dry. No rash noted. She is not diaphoretic. No pallor.   Psychiatric: She has a normal mood and affect. Judgment normal.   Nursing note and vitals reviewed.      Assessment:       1. Annual physical exam    2. Hypothyroidism due to acquired atrophy of thyroid    3. Polyp of colon, unspecified part of colon, unspecified type    4. Encounter for screening mammogram for breast cancer        Plan:    1. Blood work reviewed with pt       Vaccines: Influenza (declined); Tetanus (declined)       Eye exam: 2016       Mammogram: 12/17       Gyn exam: 2016       Colonoscopy: 12/18- repeat in 5 yrs   2. Hypothyroidism- controlled on Synthroid 50 mcg daily   3. Colon polyps- last colonoscopy in 2018   4. Mammo ordered   5. F/u in 1 yr or PRN

## 2019-04-15 ENCOUNTER — HOSPITAL ENCOUNTER (OUTPATIENT)
Dept: RADIOLOGY | Facility: HOSPITAL | Age: 54
Discharge: HOME | End: 2019-04-15
Attending: INTERNAL MEDICINE
Payer: COMMERCIAL

## 2019-04-15 DIAGNOSIS — Z12.31 ENCOUNTER FOR SCREENING MAMMOGRAM FOR BREAST CANCER: ICD-10-CM

## 2019-04-15 DIAGNOSIS — Z00.00 ANNUAL PHYSICAL EXAM: ICD-10-CM

## 2019-04-15 DIAGNOSIS — Z00.00 ANNUAL PHYSICAL EXAM: Primary | ICD-10-CM

## 2019-04-15 PROCEDURE — 77067 SCR MAMMO BI INCL CAD: CPT | Mod: TC,PO

## 2019-04-15 PROCEDURE — 77067 MAMMO DIGITAL SCREENING BILAT WITH TOMOSYNTHESIS_CAD: ICD-10-PCS | Mod: 26,,, | Performed by: RADIOLOGY

## 2019-04-15 PROCEDURE — 77067 SCR MAMMO BI INCL CAD: CPT | Mod: 26,,, | Performed by: RADIOLOGY

## 2019-04-15 PROCEDURE — 77063 BREAST TOMOSYNTHESIS BI: CPT | Mod: 26,,, | Performed by: RADIOLOGY

## 2019-04-15 PROCEDURE — 77063 MAMMO DIGITAL SCREENING BILAT WITH TOMOSYNTHESIS_CAD: ICD-10-PCS | Mod: 26,,, | Performed by: RADIOLOGY

## 2019-06-08 DIAGNOSIS — E03.4 HYPOTHYROIDISM DUE TO ACQUIRED ATROPHY OF THYROID: ICD-10-CM

## 2019-06-10 RX ORDER — LEVOTHYROXINE SODIUM 50 UG/1
TABLET ORAL
Qty: 90 TABLET | Refills: 3 | Status: SHIPPED | OUTPATIENT
Start: 2019-06-10 | End: 2020-05-26

## 2020-05-25 DIAGNOSIS — E03.4 HYPOTHYROIDISM DUE TO ACQUIRED ATROPHY OF THYROID: ICD-10-CM

## 2020-05-26 RX ORDER — LEVOTHYROXINE SODIUM 50 UG/1
TABLET ORAL
Qty: 90 TABLET | Refills: 0 | Status: SHIPPED | OUTPATIENT
Start: 2020-05-26 | End: 2020-08-21 | Stop reason: SDUPTHER

## 2020-07-10 ENCOUNTER — TELEPHONE (OUTPATIENT)
Dept: INTERNAL MEDICINE | Facility: CLINIC | Age: 55
End: 2020-07-10

## 2020-07-10 DIAGNOSIS — Z12.31 BREAST CANCER SCREENING BY MAMMOGRAM: ICD-10-CM

## 2020-07-10 DIAGNOSIS — Z00.00 ANNUAL PHYSICAL EXAM: Primary | ICD-10-CM

## 2020-07-10 NOTE — TELEPHONE ENCOUNTER
----- Message from Lliiam Hammer sent at 7/10/2020 10:12 AM CDT -----  Contact: Self [phone]  Doctor appointment and lab have been scheduled.  Please link lab orders to the lab appointment.  Date of doctor appointment:  8/21  Date of lab appointment:  7/24  Physical or EP: Physical  Comments: patient will like to have labs done sooner, please advise      Caller is requesting to schedule their annual screening mammogram appointment. Order is not listed in Epic.  Please enter order and contact patient to schedule.  Where would they like the mammogram performed?:  [facility]  Would the patient rather receive a phone call back or a response via [facility]?:  Call back   Additional information:    Patient will like to have her Mammo scheduling on 7/24/20 after lab appointment, please advise

## 2020-07-24 ENCOUNTER — LAB VISIT (OUTPATIENT)
Dept: LAB | Facility: HOSPITAL | Age: 55
End: 2020-07-24
Attending: INTERNAL MEDICINE
Payer: COMMERCIAL

## 2020-07-24 DIAGNOSIS — Z00.00 ANNUAL PHYSICAL EXAM: ICD-10-CM

## 2020-07-24 LAB
ALBUMIN SERPL BCP-MCNC: 3.6 G/DL (ref 3.5–5.2)
ALP SERPL-CCNC: 105 U/L (ref 55–135)
ALT SERPL W/O P-5'-P-CCNC: 11 U/L (ref 10–44)
ANION GAP SERPL CALC-SCNC: 7 MMOL/L (ref 8–16)
AST SERPL-CCNC: 14 U/L (ref 10–40)
BASOPHILS # BLD AUTO: 0.01 K/UL (ref 0–0.2)
BASOPHILS NFR BLD: 0.2 % (ref 0–1.9)
BILIRUB SERPL-MCNC: 0.5 MG/DL (ref 0.1–1)
BUN SERPL-MCNC: 16 MG/DL (ref 6–20)
CALCIUM SERPL-MCNC: 8.9 MG/DL (ref 8.7–10.5)
CHLORIDE SERPL-SCNC: 108 MMOL/L (ref 95–110)
CHOLEST SERPL-MCNC: 168 MG/DL (ref 120–199)
CHOLEST/HDLC SERPL: 3.7 {RATIO} (ref 2–5)
CO2 SERPL-SCNC: 27 MMOL/L (ref 23–29)
CREAT SERPL-MCNC: 1 MG/DL (ref 0.5–1.4)
DIFFERENTIAL METHOD: ABNORMAL
EOSINOPHIL # BLD AUTO: 0.1 K/UL (ref 0–0.5)
EOSINOPHIL NFR BLD: 2.2 % (ref 0–8)
ERYTHROCYTE [DISTWIDTH] IN BLOOD BY AUTOMATED COUNT: 12.5 % (ref 11.5–14.5)
EST. GFR  (AFRICAN AMERICAN): >60 ML/MIN/1.73 M^2
EST. GFR  (NON AFRICAN AMERICAN): >60 ML/MIN/1.73 M^2
ESTIMATED AVG GLUCOSE: 108 MG/DL (ref 68–131)
GLUCOSE SERPL-MCNC: 89 MG/DL (ref 70–110)
HBA1C MFR BLD HPLC: 5.4 % (ref 4–5.6)
HCT VFR BLD AUTO: 40 % (ref 37–48.5)
HDLC SERPL-MCNC: 45 MG/DL (ref 40–75)
HDLC SERPL: 26.8 % (ref 20–50)
HGB BLD-MCNC: 13 G/DL (ref 12–16)
IMM GRANULOCYTES # BLD AUTO: 0.02 K/UL (ref 0–0.04)
IMM GRANULOCYTES NFR BLD AUTO: 0.3 % (ref 0–0.5)
LDLC SERPL CALC-MCNC: 108.2 MG/DL (ref 63–159)
LYMPHOCYTES # BLD AUTO: 1.8 K/UL (ref 1–4.8)
LYMPHOCYTES NFR BLD: 31.1 % (ref 18–48)
MCH RBC QN AUTO: 31.3 PG (ref 27–31)
MCHC RBC AUTO-ENTMCNC: 32.5 G/DL (ref 32–36)
MCV RBC AUTO: 96 FL (ref 82–98)
MONOCYTES # BLD AUTO: 0.5 K/UL (ref 0.3–1)
MONOCYTES NFR BLD: 8.8 % (ref 4–15)
NEUTROPHILS # BLD AUTO: 3.3 K/UL (ref 1.8–7.7)
NEUTROPHILS NFR BLD: 57.4 % (ref 38–73)
NONHDLC SERPL-MCNC: 123 MG/DL
NRBC BLD-RTO: 0 /100 WBC
PLATELET # BLD AUTO: 150 K/UL (ref 150–350)
PMV BLD AUTO: 11.7 FL (ref 9.2–12.9)
POTASSIUM SERPL-SCNC: 4 MMOL/L (ref 3.5–5.1)
PROT SERPL-MCNC: 6.9 G/DL (ref 6–8.4)
RBC # BLD AUTO: 4.15 M/UL (ref 4–5.4)
SODIUM SERPL-SCNC: 142 MMOL/L (ref 136–145)
TRIGL SERPL-MCNC: 74 MG/DL (ref 30–150)
TSH SERPL DL<=0.005 MIU/L-ACNC: 2.56 UIU/ML (ref 0.4–4)
WBC # BLD AUTO: 5.82 K/UL (ref 3.9–12.7)

## 2020-07-24 PROCEDURE — 83036 HEMOGLOBIN GLYCOSYLATED A1C: CPT

## 2020-07-24 PROCEDURE — 84443 ASSAY THYROID STIM HORMONE: CPT

## 2020-07-24 PROCEDURE — 85025 COMPLETE CBC W/AUTO DIFF WBC: CPT

## 2020-07-24 PROCEDURE — 80061 LIPID PANEL: CPT

## 2020-07-24 PROCEDURE — 36415 COLL VENOUS BLD VENIPUNCTURE: CPT | Mod: PO

## 2020-07-24 PROCEDURE — 80053 COMPREHEN METABOLIC PANEL: CPT

## 2020-08-04 ENCOUNTER — TELEPHONE (OUTPATIENT)
Dept: OBSTETRICS AND GYNECOLOGY | Facility: CLINIC | Age: 55
End: 2020-08-04

## 2020-08-07 ENCOUNTER — TELEPHONE (OUTPATIENT)
Dept: UROGYNECOLOGY | Facility: CLINIC | Age: 55
End: 2020-08-07

## 2020-08-07 NOTE — TELEPHONE ENCOUNTER
----- Message from Blanca Niño sent at 8/7/2020  8:30 AM CDT -----  Regarding: Returning Staff Call  Name of Who is Calling : JENNA MARTIN [[id number]]    Patient is returning a call from staff   .....Please contact to further discuss and advise.    Can the clinic reply by [facility] : No    What Number to Call Back :  [phone]

## 2020-08-19 ENCOUNTER — INITIAL CONSULT (OUTPATIENT)
Dept: UROGYNECOLOGY | Facility: CLINIC | Age: 55
End: 2020-08-19
Payer: COMMERCIAL

## 2020-08-19 VITALS
BODY MASS INDEX: 32.15 KG/M2 | DIASTOLIC BLOOD PRESSURE: 80 MMHG | SYSTOLIC BLOOD PRESSURE: 120 MMHG | WEIGHT: 181.44 LBS | HEIGHT: 63 IN

## 2020-08-19 DIAGNOSIS — N99.3 VAGINAL VAULT PROLAPSE, POSTHYSTERECTOMY: ICD-10-CM

## 2020-08-19 DIAGNOSIS — N39.46 MIXED URGE AND STRESS INCONTINENCE: Primary | ICD-10-CM

## 2020-08-19 DIAGNOSIS — N81.10 PROLAPSE OF ANTERIOR VAGINAL WALL: ICD-10-CM

## 2020-08-19 PROCEDURE — 3074F PR MOST RECENT SYSTOLIC BLOOD PRESSURE < 130 MM HG: ICD-10-PCS | Mod: CPTII,S$GLB,, | Performed by: OBSTETRICS & GYNECOLOGY

## 2020-08-19 PROCEDURE — 99205 PR OFFICE/OUTPT VISIT, NEW, LEVL V, 60-74 MIN: ICD-10-PCS | Mod: S$GLB,,, | Performed by: OBSTETRICS & GYNECOLOGY

## 2020-08-19 PROCEDURE — 3079F DIAST BP 80-89 MM HG: CPT | Mod: CPTII,S$GLB,, | Performed by: OBSTETRICS & GYNECOLOGY

## 2020-08-19 PROCEDURE — 99999 PR PBB SHADOW E&M-EST. PATIENT-LVL III: CPT | Mod: PBBFAC,,, | Performed by: OBSTETRICS & GYNECOLOGY

## 2020-08-19 PROCEDURE — 99205 OFFICE O/P NEW HI 60 MIN: CPT | Mod: S$GLB,,, | Performed by: OBSTETRICS & GYNECOLOGY

## 2020-08-19 PROCEDURE — 99999 PR PBB SHADOW E&M-EST. PATIENT-LVL III: ICD-10-PCS | Mod: PBBFAC,,, | Performed by: OBSTETRICS & GYNECOLOGY

## 2020-08-19 PROCEDURE — 3008F PR BODY MASS INDEX (BMI) DOCUMENTED: ICD-10-PCS | Mod: CPTII,S$GLB,, | Performed by: OBSTETRICS & GYNECOLOGY

## 2020-08-19 PROCEDURE — 3008F BODY MASS INDEX DOCD: CPT | Mod: CPTII,S$GLB,, | Performed by: OBSTETRICS & GYNECOLOGY

## 2020-08-19 PROCEDURE — 3079F PR MOST RECENT DIASTOLIC BLOOD PRESSURE 80-89 MM HG: ICD-10-PCS | Mod: CPTII,S$GLB,, | Performed by: OBSTETRICS & GYNECOLOGY

## 2020-08-19 PROCEDURE — 3074F SYST BP LT 130 MM HG: CPT | Mod: CPTII,S$GLB,, | Performed by: OBSTETRICS & GYNECOLOGY

## 2020-08-19 NOTE — PROGRESS NOTES
Subjective:       Patient ID: Tanesha Burns is a 55 y.o. female.    Chief Complaint:  Urinary Urgency      History of Present Illness  HPI 55 Y O F  has a past medical history of Abnormal Pap smear (), Colon polyps, Encounter for blood transfusion, History of abnormal Pap smear, Hypothyroid, and Psoriasis.    Interval  HP since the last visit   1)  UI:  (+) LIZ  (+) UUI  (--) pads: changes undergarment once a week. Daytime frequency: Q 4 -5 hours.  Nocturia: No:    (-) dysuria-  (--) hematuria,  (--) frequent UTIs.  (+) complete bladder emptying.     2)  POP: Symptoms:(--)  .  (--) vaginal bleeding. (--) vaginal discharge. (+) sexually active.  (--) dyspareunia.   (--)  Vaginal dryness.  (--) vaginal estrogen use..    3)  BM:  (+) constipation/straining.  (--) chronic diarrhea. (--) hematochezia.  (--) fecal incontinence.  (--) fecal smearing/urgency.  (--) incomplete evacuation.      Will be out of insurance on . Looking to have something done prior.     2016:   Uterosacral ligament suspension, Cystourethroscopy, Posterior Colporrhaphy, Perinealplasty        GYN & OB History  Patient's last menstrual period was 2016.   Date of Last Pap: 2015    OB History    Para Term  AB Living   5 2 2   3 2   SAB TAB Ectopic Multiple Live Births   3              # Outcome Date GA Lbr Remi/2nd Weight Sex Delivery Anes PTL Lv   5 SAB            4 SAB            3 SAB            2 Term            1 Term              OB     X 2   Largest: 7#   Forceps: no  Episiotomy:  no  Degree: 3rd or 4th no    GYN  Menarche:  11  Menstrual cycle: 28/4-5/moderate flow/   Menopause:  50's  Hysterectomy: Yes, MONIK BUSTAMANTE, JAZZ   Ovaries present   Tubal ligation: YES  Other abdominal surgeries: none     Past Medical History:   Diagnosis Date    Abnormal Pap smear     cryo    Colon polyps     Encounter for blood transfusion     History of abnormal Pap smear     s/p cryo at age 16     Hypothyroid     Psoriasis      Past Surgical History:   Procedure Laterality Date    BUNIONECTOMY      bilat    COLONOSCOPY N/A 12/7/2018    Procedure: COLONOSCOPY;  Surgeon: Evelio García MD;  Location: [facility] ([address] [place] [address]);  Service: Endoscopy;  Laterality: N/A;  Requests Dec 14, 2018    CRYOTHERAPY      FOREARM FRACTURE SURGERY      HYSTERECTOMY      RETINAL LASER PROCEDURE      TEMPOROMANDIBULAR JOINT SURGERY      TUBAL LIGATION  2/09    WRIST SURGERY Left        Review of patient's allergies indicates:  No Known Allergies      Review of Systems  Review of Systems   Constitutional: Negative.  Negative for activity change, appetite change, chills, diaphoresis, fatigue, fever and unexpected weight change.   HENT: Negative.    Eyes: Negative.    Respiratory: Negative.  Negative for cough.    Cardiovascular: Negative.    Gastrointestinal: Negative for abdominal distention, abdominal pain, anal bleeding, blood in stool, diarrhea, nausea, rectal pain and vomiting.   Endocrine: Negative.    Genitourinary: Negative for decreased urine volume, difficulty urinating, dyspareunia, dysuria, enuresis, flank pain, frequency, genital sores, hematuria, menstrual problem, pelvic pain, urgency, vaginal bleeding, vaginal discharge and vaginal pain.        Prolapse  +vaginal pressure    Musculoskeletal: Negative for back pain.   Skin: Negative for color change, pallor, rash and wound.   Allergic/Immunologic: Negative for environmental allergies, food allergies and immunocompromised state.   Hematological: Negative for adenopathy. Does not bruise/bleed easily.   Psychiatric/Behavioral: Negative for agitation, behavioral problems, confusion and sleep disturbance.           Objective:     Physical Exam   Constitutional: She is oriented to person, place, and time. She appears well-developed.   HENT:   Head: Normocephalic and atraumatic.   Eyes: Pupils are equal, round, and reactive to light. Conjunctivae and EOM are normal.    Neck: Normal range of motion. Neck supple.   Cardiovascular: Normal rate, regular rhythm, S1 normal, S2 normal, normal heart sounds and intact distal pulses. Exam reveals no gallop.   No murmur heard.  Pulmonary/Chest: Effort normal and breath sounds normal. No apnea, no tachypnea and no bradypnea. No respiratory distress. She has no decreased breath sounds. She has no wheezes. She has no rhonchi. She has no rales. She exhibits no tenderness.   Abdominal: Soft. Bowel sounds are normal. She exhibits no distension and no mass. There is no splenomegaly or hepatomegaly. There is no abdominal tenderness. There is no rigidity, no rebound and no guarding. No hernia. Hernia confirmed negative in the right inguinal area and confirmed negative in the left inguinal area.   Genitourinary: Pelvic exam was performed with patient supine. Rectum normal, vagina normal, skenes normal and bartholins normal. Right labia normal and left labia normal. Urethra exhibits hypermobility. Urethra exhibits no urethral caruncle, no urethral diverticulum and no urethral mass. Right bartholin is not enlarged and not tender. Left bartholin is not enlarged and not tender. Rectal exam shows resting tone normal and active tone normal. Rectal exam shows no external hemorrhoid, no fissure, no tenderness, anal tone normal and no dovetailing. Guaiac negative stool. There is a rectocele, a cystocele and atrophy in the vagina. No foreign body, tenderness, bleeding, unspecified prolapse of vaginal walls, fistula, mesh exposure or lavator tenderness in the vagina. Right adnexum displays no tenderness. Left adnexum displays no tenderness. Cervix exhibits absence. Uterus is absent.   PVR: 90 ML  Empty cough stress test: Negative.  Kegel: 4/5    POP-Q  Aa: 1 Ba: 1 C: -1   GH: 3 PB: 2 TVL: 9   Ap: -1 Bp: -1 D:                       Genitourinary Comments: Prolapse noted best with valsalva     Musculoskeletal: Normal range of motion.   Neurological: She is alert  and oriented to person, place, and time. She has normal strength and normal reflexes. Cranial nerves II through XII intact. No cranial nerve deficit.   Skin: Skin is warm and dry.   Psychiatric: She has a normal mood and affect. Her speech is normal and behavior is normal. Judgment normal.               Assessment:        1. Mixed urge and stress incontinence    2. Vaginal vault prolapse, posthysterectomy    3. Prolapse of anterior vaginal wall            Plan:         1. Prolapse: symptomatic Stage 2 apical prolapse, Stage 2  anterior and posterior vaginal wall prolapse   --Pessary benefit discussed with patient, declined   --Daily pelvic floor exercises as assigned,  --Non surgical options discussed with patient    --Surgical options discussed such as: apical suspension: SSF, USLS and SCP with mesh augmentation.  Risks/ benefits/ alternatives/ succuss and failure rates were reviewed. Information packets were given detailing surgical options.  She was also given web site information for: www.augs.org and www.[facility]epris.org and http://www.fda.gov/MedicalDevices/UroGynSurgicalMesh/default.htm to review the details of the surgical options discussed and the use of mesh in surgery. She will review the information given and we will discuss further at the follow up visit.  She had a previous uterosacral ligament which did not hold up as well as she would like would recommend an augmented procedure given her level of activity. Risks of mesh erosion reviewed with her. Would like to proceed with surgery, most likely dates will be Oct.     2.  Mixed urinary incontinence, urge > stress:   --Bladder diary for 3-7 days, write the number of times you go to the rest room and associated symptoms of urgency or leakage.   --Empty bladder every 3 hours.  Empty well: wait a minute, lean forward on toilet.    --Avoid dietary irritants (see sheet).  Keep diary x 3-5 days to determine your irritants.  --KEGELS: do 10 in AM and 10 in PM,  holding each x 10 seconds.  When you feel urge to go, STOP, KEGEL, and when urge has passed, then go to bathroom.  Consider PT in future.    -- ?URGE = ? STRESS:  Pessary vs. Sling vs medication will proceed with SUDS      Approximately 45 min were spent in consult, 90 % in discussion.     Vivi Hernández DO  Female Pelvic Medicine and Reconstructive Surgery  [facility] [place], [place]

## 2020-08-21 ENCOUNTER — HOSPITAL ENCOUNTER (OUTPATIENT)
Dept: RADIOLOGY | Facility: HOSPITAL | Age: 55
Discharge: HOME | End: 2020-08-21
Attending: INTERNAL MEDICINE
Payer: COMMERCIAL

## 2020-08-21 ENCOUNTER — OFFICE VISIT (OUTPATIENT)
Dept: INTERNAL MEDICINE | Facility: CLINIC | Age: 55
End: 2020-08-21
Payer: COMMERCIAL

## 2020-08-21 VITALS
HEIGHT: 63 IN | TEMPERATURE: 98 F | HEART RATE: 69 BPM | DIASTOLIC BLOOD PRESSURE: 72 MMHG | WEIGHT: 179.88 LBS | SYSTOLIC BLOOD PRESSURE: 116 MMHG | RESPIRATION RATE: 16 BRPM | BODY MASS INDEX: 31.87 KG/M2

## 2020-08-21 DIAGNOSIS — Z00.00 ANNUAL PHYSICAL EXAM: Primary | ICD-10-CM

## 2020-08-21 DIAGNOSIS — K63.5 POLYP OF COLON, UNSPECIFIED PART OF COLON, UNSPECIFIED TYPE: ICD-10-CM

## 2020-08-21 DIAGNOSIS — Z12.31 BREAST CANCER SCREENING BY MAMMOGRAM: ICD-10-CM

## 2020-08-21 DIAGNOSIS — E03.4 HYPOTHYROIDISM DUE TO ACQUIRED ATROPHY OF THYROID: ICD-10-CM

## 2020-08-21 PROCEDURE — 77063 MAMMO DIGITAL SCREENING BILAT WITH TOMOSYNTHESIS_CAD: ICD-10-PCS | Mod: 26,,, | Performed by: RADIOLOGY

## 2020-08-21 PROCEDURE — 3078F PR MOST RECENT DIASTOLIC BLOOD PRESSURE < 80 MM HG: ICD-10-PCS | Mod: CPTII,S$GLB,, | Performed by: INTERNAL MEDICINE

## 2020-08-21 PROCEDURE — 77063 BREAST TOMOSYNTHESIS BI: CPT | Mod: 26,,, | Performed by: RADIOLOGY

## 2020-08-21 PROCEDURE — 3078F DIAST BP <80 MM HG: CPT | Mod: CPTII,S$GLB,, | Performed by: INTERNAL MEDICINE

## 2020-08-21 PROCEDURE — 99396 PR PREVENTIVE VISIT,EST,40-64: ICD-10-PCS | Mod: S$GLB,,, | Performed by: INTERNAL MEDICINE

## 2020-08-21 PROCEDURE — 77067 SCR MAMMO BI INCL CAD: CPT | Mod: TC,PO

## 2020-08-21 PROCEDURE — 3074F PR MOST RECENT SYSTOLIC BLOOD PRESSURE < 130 MM HG: ICD-10-PCS | Mod: CPTII,S$GLB,, | Performed by: INTERNAL MEDICINE

## 2020-08-21 PROCEDURE — 99999 PR PBB SHADOW E&M-EST. PATIENT-LVL III: ICD-10-PCS | Mod: PBBFAC,,, | Performed by: INTERNAL MEDICINE

## 2020-08-21 PROCEDURE — 3008F BODY MASS INDEX DOCD: CPT | Mod: CPTII,S$GLB,, | Performed by: INTERNAL MEDICINE

## 2020-08-21 PROCEDURE — 99999 PR PBB SHADOW E&M-EST. PATIENT-LVL III: CPT | Mod: PBBFAC,,, | Performed by: INTERNAL MEDICINE

## 2020-08-21 PROCEDURE — 3008F PR BODY MASS INDEX (BMI) DOCUMENTED: ICD-10-PCS | Mod: CPTII,S$GLB,, | Performed by: INTERNAL MEDICINE

## 2020-08-21 PROCEDURE — 99396 PREV VISIT EST AGE 40-64: CPT | Mod: S$GLB,,, | Performed by: INTERNAL MEDICINE

## 2020-08-21 PROCEDURE — 3074F SYST BP LT 130 MM HG: CPT | Mod: CPTII,S$GLB,, | Performed by: INTERNAL MEDICINE

## 2020-08-21 PROCEDURE — 77067 MAMMO DIGITAL SCREENING BILAT WITH TOMOSYNTHESIS_CAD: ICD-10-PCS | Mod: 26,,, | Performed by: RADIOLOGY

## 2020-08-21 PROCEDURE — 77067 SCR MAMMO BI INCL CAD: CPT | Mod: 26,,, | Performed by: RADIOLOGY

## 2020-08-21 RX ORDER — LEVOTHYROXINE SODIUM 50 UG/1
50 TABLET ORAL DAILY
Qty: 90 TABLET | Refills: 3 | Status: SHIPPED | OUTPATIENT
Start: 2020-08-21 | End: 2020-08-21 | Stop reason: SDUPTHER

## 2020-08-21 RX ORDER — LEVOTHYROXINE SODIUM 50 UG/1
50 TABLET ORAL DAILY
Qty: 90 TABLET | Refills: 3 | Status: SHIPPED | OUTPATIENT
Start: 2020-08-21 | End: 2021-05-06 | Stop reason: SDUPTHER

## 2020-08-21 NOTE — PROGRESS NOTES
Subjective:       Patient ID: Tanesha Burns is a 55 y.o. female.    Chief Complaint: Annual Exam    HPI   55 y.o. Female here for annual exam.      Vaccines: Influenza (declined); Tetanus (declined)  Eye exam: 2016  Mammogram: 8/20  Gyn exam: 2016  Colonoscopy: 12/18- repeat in 5 yrs     Exercise: no  Diet: regular     Past Medical History:    Hysterectomy     Abnormal Pap smear                              1983            Comment:cryo    Colon polyps                                                  Encounter for blood transfusion                               History of abnormal Pap smear                                   Comment:s/p cryo at age 16    Hypothyroid                                                   Psoriasis                                                   Past Surgical History:    TEMPOROMANDIBULAR JOINT SURGERY                                BUNIONECTOMY                                                     Comment:bilat    RETINAL LASER PROCEDURE                                        TUBAL LIGATION                                   2/09          CRYOTHERAPY                                                    FOREARM FRACTURE SURGERY                                       WRIST SURGERY                                   Left               HYSTERECTOMY                                                 Social History    Marital status:             Spouse name:                       Years of education:                 Number of children: 2              Occupational History  Occupation          Employer            Comment                                             Social History Main Topics    Smoking status: Never Smoker                                                                    Smokeless status: Not on file                       Alcohol use: No              Drug use: No              Sexual activity: Yes               Partners with: Male     No Known Allergies     Review  of Systems   Constitutional: Negative for activity change, appetite change, chills, diaphoresis, fatigue, fever and unexpected weight change.   HENT: Negative for nasal congestion, mouth sores, postnasal drip, rhinorrhea, sinus pressure/congestion, sneezing, sore throat, trouble swallowing and voice change.    Eyes: Negative for pain, discharge and visual disturbance.   Respiratory: Negative for cough, shortness of breath and wheezing.    Cardiovascular: Negative for chest pain, palpitations and leg swelling.   Gastrointestinal: Negative for abdominal pain, blood in stool, constipation, diarrhea, nausea and vomiting.   Endocrine: Negative for cold intolerance and heat intolerance.   Genitourinary: Negative for difficulty urinating, dysuria, frequency, hematuria and urgency.   Musculoskeletal: Negative for arthralgias and myalgias.   Integumentary:  Negative for rash and wound.   Allergic/Immunologic: Negative for environmental allergies and food allergies.   Neurological: Negative for dizziness, tremors, seizures, syncope, weakness, light-headedness and headaches.   Hematological: Negative for adenopathy. Does not bruise/bleed easily.   Psychiatric/Behavioral: Negative for confusion and sleep disturbance. The patient is not nervous/anxious.          Objective:      Physical Exam  Vitals signs and nursing note reviewed.   Constitutional:       General: She is not in acute distress.     Appearance: She is well-developed. She is not diaphoretic.   HENT:      Head: Normocephalic and atraumatic.      Right Ear: External ear normal.      Left Ear: External ear normal.      Nose: Nose normal.      Mouth/Throat:      Pharynx: No oropharyngeal exudate.   Eyes:      General: No scleral icterus.        Right eye: No discharge.         Left eye: No discharge.      Conjunctiva/sclera: Conjunctivae normal.      Pupils: Pupils are equal, round, and reactive to light.   Neck:      Musculoskeletal: Neck supple.      Thyroid: No  thyromegaly.      Vascular: No JVD.   Cardiovascular:      Rate and Rhythm: Normal rate and regular rhythm.      Heart sounds: Normal heart sounds. No murmur.   Pulmonary:      Effort: Pulmonary effort is normal. No respiratory distress.      Breath sounds: Normal breath sounds. No wheezing or rales.   Chest:      Chest wall: No tenderness.   Abdominal:      General: Bowel sounds are normal. There is no distension.      Palpations: Abdomen is soft.      Tenderness: There is no abdominal tenderness. There is no guarding.   Lymphadenopathy:      Cervical: No cervical adenopathy.   Skin:     General: Skin is warm and dry.      Coloration: Skin is not pale.      Findings: No rash.   Neurological:      Mental Status: She is alert and oriented to person, place, and time.   Psychiatric:         Judgment: Judgment normal.         Assessment:       1. Annual physical exam    2. Hypothyroidism due to acquired atrophy of thyroid    3. Polyp of colon, unspecified part of colon, unspecified type        Plan:    1. Blood work reviewed with pt       Vaccines: Influenza (declined); Tetanus (declined)       Eye exam: 2016       Mammogram: 8/20       Gyn exam: 2016       Colonoscopy: 12/18- repeat in 5 yrs   2. Hypothyroidism- controlled on Synthroid 50 mcg daily   3. Colon polyps- last colonoscopy in 2018   4. F/u in 1 yr or PRN

## 2020-08-24 ENCOUNTER — TELEPHONE (OUTPATIENT)
Dept: RADIOLOGY | Facility: HOSPITAL | Age: 55
End: 2020-08-24

## 2020-08-24 NOTE — TELEPHONE ENCOUNTER
Spoke with patient and explained mammogram findings.Patient expressed understanding of results. Patient scheduled abnormal mammogram follow up appointment at [facility] on 9/1/2020.

## 2020-09-01 ENCOUNTER — HOSPITAL ENCOUNTER (OUTPATIENT)
Dept: RADIOLOGY | Facility: HOSPITAL | Age: 55
Discharge: HOME | End: 2020-09-01
Attending: INTERNAL MEDICINE
Payer: COMMERCIAL

## 2020-09-01 DIAGNOSIS — R92.8 ABNORMAL MAMMOGRAM: ICD-10-CM

## 2020-09-01 PROCEDURE — 76642 ULTRASOUND BREAST LIMITED: CPT | Mod: 26,RT,, | Performed by: RADIOLOGY

## 2020-09-01 PROCEDURE — 76642 US BREAST RIGHT LIMITED: ICD-10-PCS | Mod: 26,RT,, | Performed by: RADIOLOGY

## 2020-09-01 PROCEDURE — 76642 ULTRASOUND BREAST LIMITED: CPT | Mod: TC,RT

## 2020-09-15 ENCOUNTER — PROCEDURE VISIT (OUTPATIENT)
Dept: UROGYNECOLOGY | Facility: CLINIC | Age: 55
End: 2020-09-15
Payer: COMMERCIAL

## 2020-09-15 DIAGNOSIS — N39.46 MIXED URGE AND STRESS INCONTINENCE: ICD-10-CM

## 2020-09-15 DIAGNOSIS — N81.9 VAGINAL VAULT PROLAPSE: Primary | ICD-10-CM

## 2020-09-15 LAB
BILIRUB SERPL-MCNC: NORMAL MG/DL
BLOOD URINE, POC: NORMAL
CLARITY, POC UA: CLEAR
COLOR, POC UA: NORMAL
GLUCOSE UR QL STRIP: NORMAL
KETONES UR QL STRIP: NORMAL
LEUKOCYTE ESTERASE URINE, POC: NORMAL
NITRITE, POC UA: NORMAL
PH, POC UA: 5
PROTEIN, POC: NORMAL
SPECIFIC GRAVITY, POC UA: 1.01
UROBILINOGEN, POC UA: NORMAL

## 2020-09-15 PROCEDURE — 51784 PR ANAL/URINARY MUSCLE STUDY: ICD-10-PCS | Mod: 51,S$GLB,, | Performed by: OBSTETRICS & GYNECOLOGY

## 2020-09-15 PROCEDURE — 52000 CYSTOURETHROSCOPY: CPT | Mod: 59,S$GLB,, | Performed by: OBSTETRICS & GYNECOLOGY

## 2020-09-15 PROCEDURE — 52000 PR CYSTOURETHROSCOPY: ICD-10-PCS | Mod: 59,S$GLB,, | Performed by: OBSTETRICS & GYNECOLOGY

## 2020-09-15 PROCEDURE — 51741 PR UROFLOWMETRY, COMPLEX: ICD-10-PCS | Mod: 51,S$GLB,, | Performed by: OBSTETRICS & GYNECOLOGY

## 2020-09-15 PROCEDURE — 51728 PR COMPLEX CYSTOMETROGRAM VOIDING PRESSURE STUDIES: ICD-10-PCS | Mod: S$GLB,,, | Performed by: OBSTETRICS & GYNECOLOGY

## 2020-09-15 PROCEDURE — 51728 CYSTOMETROGRAM W/VP: CPT | Mod: S$GLB,,, | Performed by: OBSTETRICS & GYNECOLOGY

## 2020-09-15 PROCEDURE — 81002 URINALYSIS NONAUTO W/O SCOPE: CPT | Mod: S$GLB,,, | Performed by: OBSTETRICS & GYNECOLOGY

## 2020-09-15 PROCEDURE — 51784 ANAL/URINARY MUSCLE STUDY: CPT | Mod: 51,S$GLB,, | Performed by: OBSTETRICS & GYNECOLOGY

## 2020-09-15 PROCEDURE — 51741 ELECTRO-UROFLOWMETRY FIRST: CPT | Mod: 51,S$GLB,, | Performed by: OBSTETRICS & GYNECOLOGY

## 2020-09-15 PROCEDURE — 81002 POCT URINE DIPSTICK WITHOUT MICROSCOPE: ICD-10-PCS | Mod: S$GLB,,, | Performed by: OBSTETRICS & GYNECOLOGY

## 2020-09-15 RX ORDER — CIPROFLOXACIN 500 MG/1
500 TABLET ORAL
Status: CANCELLED | OUTPATIENT
Start: 2020-09-15 | End: 2020-09-15

## 2020-09-15 RX ORDER — SULFAMETHOXAZOLE AND TRIMETHOPRIM 800; 160 MG/1; MG/1
1 TABLET ORAL
Status: COMPLETED | OUTPATIENT
Start: 2020-09-15 | End: 2020-09-15

## 2020-09-15 RX ORDER — LIDOCAINE HYDROCHLORIDE 20 MG/ML
JELLY TOPICAL ONCE
Status: COMPLETED | OUTPATIENT
Start: 2020-09-15 | End: 2020-09-15

## 2020-09-15 RX ADMIN — LIDOCAINE HYDROCHLORIDE 5 ML: 20 JELLY TOPICAL at 09:09

## 2020-09-15 RX ADMIN — SULFAMETHOXAZOLE AND TRIMETHOPRIM 1 TABLET: 800; 160 TABLET ORAL at 09:09

## 2020-09-15 NOTE — PROCEDURES
Procedures     TITLE OF OPERATION:  1. Complex cystometry.  2. Complex uroflowmetry.  3. Electromyography with surface electrodes.  4. Pressure voiding flow study.  5. Abdominal pressure measurement.  6. Leak point pressure measurement.    INDICATIONS:  HPI 55 Y O F  has a past medical history of Abnormal Pap smear (1983), Colon polyps, Encounter for blood transfusion, History of abnormal Pap smear, Hypothyroid, and Psoriasis.     Interval  HP since the last visit   1)  UI:  (+) LIZ  (+) UUI  (--) pads: changes undergarment once a week. Daytime frequency: Q 4 -5 hours.  Nocturia: No:    (-) dysuria-  (--) hematuria,  (--) frequent UTIs.  (+) complete bladder emptying.      2)  POP: Symptoms:(--)  .  (--) vaginal bleeding. (--) vaginal discharge. (+) sexually active.  (--) dyspareunia.   (--)  Vaginal dryness.  (--) vaginal estrogen use..     3)  BM:  (+) constipation/straining.  (--) chronic diarrhea. (--) hematochezia.  (--) fecal incontinence.  (--) fecal smearing/urgency.  (--) incomplete evacuation.       Will be out of insurance on November 30th. Looking to have something done prior.      2016:   Uterosacral ligament suspension, Cystourethroscopy, Posterior Colporrhaphy, Perinealplasty      PREOPERATIVE DIAGNOSIS:  1. Mixed urinary incontinence  2. Vault prolapse     POSTOPERATIVE DIAGNOSIS:  1. Mixed urinary incontinence   2. Vault prolapse     ANESTHESIA:  None.    SPECIMEN (BACTERIOLOGICAL, PATHOLOGICAL OR OTHER):  None.    PROSTHETIC DEVICE/IMPLANT:  None.    SURGEONS NARRATIVE:  A time out was performed in which the patient identity and procedure were confirmed.  Urodynamic evaluation was performed using a computerized system ([employer]  Uroflowmetry was performed on the patient in the sitting position without catheters in place.  Subsequent urodynamic testing was performed with the patient in the lithotomy position at 45 degrees. Air charged catheters were used with sterile water as the  infusion medium. Vesical and abdominal (rectal) pressures were measured, and detrusor pressure was calculated. EMG activity was recorded with surface electrodes. During filling, room temperature sterile water was infused at a rate of 30 cubic centimeters per minute. The patient was asked cough after instillation of each 100cc volume. Two Valsalva leak point pressures and two cough leak point pressures were performed with the catheters in place at 150, 300 cubic centimeters and again at maximum capacity. Valsalva leak point pressure was defined as the difference between vesical pressure at which leakage was noted (visualized at the external urethral meatus) and the baseline vesical pressure. Following urodynamic testing, a pressure flow study was performed with the patient in the sitting position. Vesical and abdominal pressures were monitored and detrusor pressures were calculated. After the pressure flow study, the catheters were then removed. The patient tolerated the procedure well.     Urine dipstick: normal     1.  VOIDING PHASE:      a.  Uroflowmetry:   Prolapse reduction: No   Voided volume:   mL    Voiding time:    seconds   Max flow:   mL/s   Avg flow:    mL/s    PVR:    mL    The overall configuration of this uroflow study was  normal flow   b.  Pressure flow:   Prolapse reduction: No   Voided volume:   348 mL   Voiding time:   67 seconds   Peak flow:  12 mL/s    Avg flow:  5 mL/s   Max det pressure:  76  cm H20   Det pressure at max flow: 75 cm H20   Void initiated by  Permission .     Urethral relaxation (EMG):  Normal .     PVR (calculated):  90 mL    The overall configuration of this pressure flow study was normal flow .      2.  FILLING PHASE:   1st desire: 40 mL   Normal desire:  96 mL   Strong desire:  292 mL   Urgency:  446 mL   Compliance (calculated)  7.3 mL/cm H20   EMG activity during filling:  normal    Detrusor contractions observed: Yes.  With leakage X 2    3.   URETHRAL FUNCTION/STORAGE PHASE:    a.  WITHOUT prolapse reduction:   CLPP (150 mL): Negative  at  170 cm H20   VLPP (150 mL): Negative  at  143 cm H20    CLPP (MAX ):    Negative  at  171 cm H20   VLPP (MAX):     Negative  at  102 cm H20  b.  WITH prolapse reduction:   CLPP (MAX ):  Negative  at  198 cm H20   VLPP (MAX):    Negative  at  180 cm H20    These findings are consistent with Negative urodynamic stress incontinence for the CMG portion however patient was very tense and noted to have a contracted pelvic floor. When she was able to relax we were able to see leakage with cough.    Assessment:  UF  incompetent   PF  prolapsed.  Compliance   normal .  Max capacity  Low .  DO (+).  LIZ (--).      Plan:  Long discussion of options, she has stress predominant urinary incontinence. She  We were able to identify stress leakage only leakages at capacity after the cystoscopy.  Recommend mid urethral sling: We've reviewed the options of : Trans obturator/ retropubic/single incision. In this case I think the single incision would be best for her.        Date: 6/8/2020    Title of Operation:   Cystourethroscopy.     INDICATIONS:  HPI 55 Y O F  has a past medical history of Abnormal Pap smear (1983), Colon polyps, Encounter for blood transfusion, History of abnormal Pap smear, Hypothyroid, and Psoriasis.     Interval  HP since the last visit   1)  UI:  (+) LIZ  (+) UUI  (--) pads: changes undergarment once a week. Daytime frequency: Q 4 -5 hours.  Nocturia: No:    (-) dysuria-  (--) hematuria,  (--) frequent UTIs.  (+) complete bladder emptying.      2)  POP: Symptoms:(--)  .  (--) vaginal bleeding. (--) vaginal discharge. (+) sexually active.  (--) dyspareunia.   (--)  Vaginal dryness.  (--) vaginal estrogen use..     3)  BM:  (+) constipation/straining.  (--) chronic diarrhea. (--) hematochezia.  (--) fecal incontinence.  (--) fecal smearing/urgency.  (--) incomplete evacuation.       Will be out of insurance on  November 30th. Looking to have something done prior.      2016:   Uterosacral ligament suspension, Cystourethroscopy, Posterior Colporrhaphy, Perinealplasty      PREOPERATIVE DIAGNOSIS  1.  Mixed urinary incontinence  2.  Vault prolapse       POSTOPERATIVE DIAGNOSIS:   1.  Mixed urinary incontinence  2.  Vault prolapse       Anesthesia:   2% Xylocaine gel.    Specimen (Bacteriological, Pathological or other):   None.     Prosthetic Device/Implant:   None.     Surgeons Narrative:     After informed consent was obtained, the patient was placed in the lithotomy position. The urethral meatus was prepped with Betadine and 10 cubic centimeters of 2% Xylocaine gel were introduced into the urethra. A flexible cystourethroscope was introduced into the bladder. The bladder was distended with approximately 300 cubic centimeters of sterile water. A systematic survey was performed in which the bladder was surveyed using multiple sequential passes in a clockwise fashion from the bladder dome to the bladder base to the urethrovesical junction. The trigone and ureteral orifices were observed. A midline cystocele was noted. The scope was then flipped back on itself, and the urethrovesical junction was viewed. A vaginal examining finger was then placed with pressure suburethrally at the urethrovesical junction as the telescope was withdrawn in order to perform positive pressure urethroscopy.  Standard maneuvers of cough, squeeze and Valsalva were performed. The telescope was then completely withdrawn.     Findings: Urethroscopy:  Normal.  Cystoscopy:  Normal bladder mucosa, bilateral ureteral flow was noted.      Assessment: Essentially normal cystourethroscopy.      Plan: The patient will follow up with Dr Hernández as scheduled.  See urodynamics note for further plan details.

## 2020-09-24 ENCOUNTER — PATIENT OUTREACH (OUTPATIENT)
Dept: ADMINISTRATIVE | Facility: OTHER | Age: 55
End: 2020-09-24

## 2020-09-24 NOTE — PROGRESS NOTES
Care Everywhere:   Immunization: updated(delay in links system)   Health Maintenance: updated  Media Review:   Legacy Review:   Order placed:   Upcoming appts:

## 2020-09-25 ENCOUNTER — OFFICE VISIT (OUTPATIENT)
Dept: UROGYNECOLOGY | Facility: CLINIC | Age: 55
End: 2020-09-25
Payer: COMMERCIAL

## 2020-09-25 DIAGNOSIS — Z01.818 PRE-OP EVALUATION: ICD-10-CM

## 2020-09-25 DIAGNOSIS — N81.10 PROLAPSE OF ANTERIOR VAGINAL WALL: ICD-10-CM

## 2020-09-25 DIAGNOSIS — N99.3 VAGINAL VAULT PROLAPSE, POSTHYSTERECTOMY: Primary | ICD-10-CM

## 2020-09-25 PROCEDURE — 99214 PR OFFICE/OUTPT VISIT, EST, LEVL IV, 30-39 MIN: ICD-10-PCS | Mod: 95,,, | Performed by: OBSTETRICS & GYNECOLOGY

## 2020-09-25 PROCEDURE — 99214 OFFICE O/P EST MOD 30 MIN: CPT | Mod: 95,,, | Performed by: OBSTETRICS & GYNECOLOGY

## 2020-09-25 RX ORDER — AZITHROMYCIN 250 MG/1
TABLET, FILM COATED ORAL
Qty: 6 TABLET | Refills: 0 | Status: SHIPPED | OUTPATIENT
Start: 2020-09-25 | End: 2020-09-30

## 2020-09-25 RX ORDER — OXYCODONE AND ACETAMINOPHEN 5; 325 MG/1; MG/1
1 TABLET ORAL EVERY 4 HOURS PRN
Qty: 20 TABLET | Refills: 0 | Status: SHIPPED | OUTPATIENT
Start: 2020-09-25 | End: 2020-10-07 | Stop reason: CLARIF

## 2020-09-25 RX ORDER — IBUPROFEN 600 MG/1
600 TABLET ORAL EVERY 6 HOURS PRN
Qty: 30 TABLET | Refills: 1 | Status: SHIPPED | OUTPATIENT
Start: 2020-09-25 | End: 2020-10-07 | Stop reason: CLARIF

## 2020-09-25 RX ORDER — DOCUSATE SODIUM 100 MG/1
100 CAPSULE, LIQUID FILLED ORAL 2 TIMES DAILY
Qty: 60 CAPSULE | Refills: 11 | Status: SHIPPED | OUTPATIENT
Start: 2020-09-25 | End: 2020-10-07 | Stop reason: CLARIF

## 2020-09-25 NOTE — PROGRESS NOTES
Subjective:       Patient ID: Tanesha Burns is a 55 y.o. female.    Chief Complaint:  No chief complaint on file.    The patient location is: [place]  The chief complaint leading to consultation is:  Vault prolapse     Visit type: audiovisual    Face to Face time with patient: 45 minutes of total time spent on the encounter, which includes face to face time and non-face to face time preparing to see the patient (eg, review of tests), Obtaining and/or reviewing separately obtained history, Documenting clinical information in the electronic or other health record, Independently interpreting results (not separately reported) and communicating results to the patient/family/caregiver, or Care coordination (not separately reported).     Each patient to whom he or she provides medical services by telemedicine is:  (1) informed of the relationship between the physician and patient and the respective role of any other health care provider with respect to management of the patient; and (2) notified that he or she may decline to receive medical services by telemedicine and may withdraw from such care at any time.    Notes: see below     History of Present Illness  HPI 55 Y O F  has a past medical history of Abnormal Pap smear (1983), Colon polyps, Encounter for blood transfusion, History of abnormal Pap smear, Hypothyroid, and Psoriasis.    Interval  HP since the last visit   1)  UI:  (+) LIZ  (+) UUI  (--) pads: changes undergarment once a week. Daytime frequency: Q 4 -5 hours.  Nocturia: No:    (-) dysuria-  (--) hematuria,  (--) frequent UTIs.  (+) complete bladder emptying.     2)  POP: Symptoms:(--)  .  (--) vaginal bleeding. (--) vaginal discharge. (+) sexually active.  (--) dyspareunia.   (--)  Vaginal dryness.  (--) vaginal estrogen use..    3)  BM:  (+) constipation/straining.  (--) chronic diarrhea. (--) hematochezia.  (--) fecal incontinence.  (--) fecal smearing/urgency.  (--) incomplete evacuation.      4) Sinus  infection- has has this frequently in the plast, currently has bothersome green mucous production and sinus tenderness, Requsting a z-Pac .     Will be out of insurance on . Looking to have something done prior.     2016:   Uterosacral ligament suspension, Cystourethroscopy, Posterior Colporrhaphy, Perinealplasty      Suds: + LIZ       GYN & OB History  Patient's last menstrual period was 2016.   Date of Last Pap: 2015    OB History    Para Term  AB Living   5 2 2   3 2   SAB TAB Ectopic Multiple Live Births   3              # Outcome Date GA Lbr Remi/2nd Weight Sex Delivery Anes PTL Lv   5 SAB            4 SAB            3 SAB            2 Term            1 Term              OB     X 2   Largest: 7#   Forceps: no  Episiotomy:  no  Degree: 3rd or 4th no    GYN  Menarche:  11  Menstrual cycle: 28/4-5/moderate flow/   Menopause:  50's  Hysterectomy: Yes, MONIK BUSTAMANTE, JAZZ   Ovaries present   Tubal ligation: YES  Other abdominal surgeries: none     Past Medical History:   Diagnosis Date    Abnormal Pap smear     cryo    Colon polyps     Encounter for blood transfusion     History of abnormal Pap smear     s/p cryo at age 16    Hypothyroid     Psoriasis      Past Surgical History:   Procedure Laterality Date    BUNIONECTOMY      bilat    COLONOSCOPY N/A 2018    Procedure: COLONOSCOPY;  Surgeon: Evelio García MD;  [facility] [place] [address]);  Service: Endoscopy;  Laterality: N/A;  Requests Dec 14, 2018    CRYOTHERAPY      FOREARM FRACTURE SURGERY      HYSTERECTOMY      RETINAL LASER PROCEDURE      TEMPOROMANDIBULAR JOINT SURGERY      TUBAL LIGATION      WRIST SURGERY Left        Review of patient's allergies indicates:  No Known Allergies      Review of Systems  Review of Systems   Constitutional: Negative.  Negative for activity change, appetite change, chills, diaphoresis, fatigue, fever and unexpected weight change.   HENT: Negative.    Eyes:  Negative.    Respiratory: Negative.  Negative for cough.    Cardiovascular: Negative.    Gastrointestinal: Negative for abdominal distention, abdominal pain, anal bleeding, blood in stool, diarrhea, nausea, rectal pain and vomiting.   Endocrine: Negative.    Genitourinary: Negative for decreased urine volume, difficulty urinating, dyspareunia, dysuria, enuresis, flank pain, frequency, genital sores, hematuria, menstrual problem, pelvic pain, urgency, vaginal bleeding, vaginal discharge and vaginal pain.        Prolapse  +vaginal pressure    Musculoskeletal: Negative for back pain.   Skin: Negative for color change, pallor, rash and wound.   Allergic/Immunologic: Negative for environmental allergies, food allergies and immunocompromised state.   Hematological: Negative for adenopathy. Does not bruise/bleed easily.   Psychiatric/Behavioral: Negative for agitation, behavioral problems, confusion and sleep disturbance.           Objective:     Physical Exam   Constitutional: She is oriented to person, place, and time. She appears well-developed and well-nourished.   HENT:   Head: Normocephalic and atraumatic.   Neck: Normal range of motion. Neck supple.   Pulmonary/Chest: No respiratory distress. She has no wheezes.   Musculoskeletal: Normal range of motion.   Neurological: She is alert and oriented to person, place, and time.   Skin: No cyanosis. Nails show no clubbing.   Psychiatric: She has a normal mood and affect. Her behavior is normal. Judgment and thought content normal.               Assessment:        1. Vaginal vault prolapse, posthysterectomy    2. Pre-op evaluation    3. Prolapse of anterior vaginal wall            Plan:          Patient consented for laparoscopic assisted robotic sacrocolpopexy with synthetic mesh, possible anterior/posterior repair with perineorrhaphy, possible laparotomy, placement of synthetic midurethral sling, and cystourethroscopy possible sacrospinous ligatment suspension.   R/B/A  reviewed. Specific risks reviewed include:  infection, bleeding, need for blood transfusion, damage to surrounding structures, anesthesia risks, death, heart attack, stroke, mesh erosion/extrusion, pain, dyspareunia, urinary retention, voiding dysfunction, urinary incontinence, exacerbation of urinary urge incontinence, and need for further surgeries.  We reviewed potential for failure of POP defect repair and need for future surgery, with no way of predicting risk.  She understands success rate of ASC approaches 85%.  Success rate of midurethral sling for LIZ was reviewed as 80-85% single incision vs retropubic sling vs transoburator discussed will proceed with single incision, and she understands that this will not necessarily impact other types of urinary incontinence.  Alternatives reviewed include: pessary/PT for POP and pessary/periurethral injections/PT/medication for LIZ. All questions were answered and consents were discussed in detail she has an appointment to meet with my staff to  Sign consents, the paperwork we reviewed was placed in the chart to be signed at that visit.  1. T&S,  DOS  2. Clearance for OR per PCP- pending  3. VTE Prophylaxis:  SCDs  4. Proceed to OR for above-mentioned procedure.  5. Patient instructed on mag citrate chlorahexadine/dial soap prep to perform day before & AM of surgery.       Approximately 35  min were spent in consult, 90 % in discussion.     Vivi Hernández DO  Female Pelvic Medicine and Reconstructive Surgery  [facility] [place], [place]

## 2020-09-25 NOTE — H&P (VIEW-ONLY)
Subjective:       Patient ID: Tanesha Burns is a 55 y.o. female.    Chief Complaint:  No chief complaint on file.    The patient location is: [place]  The chief complaint leading to consultation is:  Vault prolapse     Visit type: audiovisual    Face to Face time with patient: 45 minutes of total time spent on the encounter, which includes face to face time and non-face to face time preparing to see the patient (eg, review of tests), Obtaining and/or reviewing separately obtained history, Documenting clinical information in the electronic or other health record, Independently interpreting results (not separately reported) and communicating results to the patient/family/caregiver, or Care coordination (not separately reported).     Each patient to whom he or she provides medical services by telemedicine is:  (1) informed of the relationship between the physician and patient and the respective role of any other health care provider with respect to management of the patient; and (2) notified that he or she may decline to receive medical services by telemedicine and may withdraw from such care at any time.    Notes: see below     History of Present Illness  HPI 55 Y O F  has a past medical history of Abnormal Pap smear (1983), Colon polyps, Encounter for blood transfusion, History of abnormal Pap smear, Hypothyroid, and Psoriasis.    Interval  HP since the last visit   1)  UI:  (+) LIZ  (+) UUI  (--) pads: changes undergarment once a week. Daytime frequency: Q 4 -5 hours.  Nocturia: No:    (-) dysuria-  (--) hematuria,  (--) frequent UTIs.  (+) complete bladder emptying.     2)  POP: Symptoms:(--)  .  (--) vaginal bleeding. (--) vaginal discharge. (+) sexually active.  (--) dyspareunia.   (--)  Vaginal dryness.  (--) vaginal estrogen use..    3)  BM:  (+) constipation/straining.  (--) chronic diarrhea. (--) hematochezia.  (--) fecal incontinence.  (--) fecal smearing/urgency.  (--) incomplete evacuation.      4) Sinus  infection- has has this frequently in the plast, currently has bothersome green mucous production and sinus tenderness, Requsting a z-Pac .     Will be out of insurance on . Looking to have something done prior.     2016:   Uterosacral ligament suspension, Cystourethroscopy, Posterior Colporrhaphy, Perinealplasty      Suds: + LIZ       GYN & OB History  Patient's last menstrual period was 2016.   Date of Last Pap: 2015    OB History    Para Term  AB Living   5 2 2   3 2   SAB TAB Ectopic Multiple Live Births   3              # Outcome Date GA Lbr Remi/2nd Weight Sex Delivery Anes PTL Lv   5 SAB            4 SAB            3 SAB            2 Term            1 Term              OB     X 2   Largest: 7#   Forceps: no  Episiotomy:  no  Degree: 3rd or 4th no    GYN  Menarche:  11  Menstrual cycle: 28/4-5/moderate flow/   Menopause:  50's  Hysterectomy: Yes, MONIK BUSTAMANTE, JAZZ   Ovaries present   Tubal ligation: YES  Other abdominal surgeries: none     Past Medical History:   Diagnosis Date    Abnormal Pap smear     cryo    Colon polyps     Encounter for blood transfusion     History of abnormal Pap smear     s/p cryo at age 16    Hypothyroid     Psoriasis      Past Surgical History:   Procedure Laterality Date    BUNIONECTOMY      bilat    COLONOSCOPY N/A 2018    Procedure: COLONOSCOPY;  Surgeon: Evelio García MD;  Location: [facility] ([address] [place] [address]);  Service: Endoscopy;  Laterality: N/A;  Requests Dec 14, 2018    CRYOTHERAPY      FOREARM FRACTURE SURGERY      HYSTERECTOMY      RETINAL LASER PROCEDURE      TEMPOROMANDIBULAR JOINT SURGERY      TUBAL LIGATION      WRIST SURGERY Left        Review of patient's allergies indicates:  No Known Allergies      Review of Systems  Review of Systems   Constitutional: Negative.  Negative for activity change, appetite change, chills, diaphoresis, fatigue, fever and unexpected weight change.   HENT: Negative.    Eyes:  Negative.    Respiratory: Negative.  Negative for cough.    Cardiovascular: Negative.    Gastrointestinal: Negative for abdominal distention, abdominal pain, anal bleeding, blood in stool, diarrhea, nausea, rectal pain and vomiting.   Endocrine: Negative.    Genitourinary: Negative for decreased urine volume, difficulty urinating, dyspareunia, dysuria, enuresis, flank pain, frequency, genital sores, hematuria, menstrual problem, pelvic pain, urgency, vaginal bleeding, vaginal discharge and vaginal pain.        Prolapse  +vaginal pressure    Musculoskeletal: Negative for back pain.   Skin: Negative for color change, pallor, rash and wound.   Allergic/Immunologic: Negative for environmental allergies, food allergies and immunocompromised state.   Hematological: Negative for adenopathy. Does not bruise/bleed easily.   Psychiatric/Behavioral: Negative for agitation, behavioral problems, confusion and sleep disturbance.           Objective:     Physical Exam   Constitutional: She is oriented to person, place, and time. She appears well-developed and well-nourished.   HENT:   Head: Normocephalic and atraumatic.   Neck: Normal range of motion. Neck supple.   Pulmonary/Chest: No respiratory distress. She has no wheezes.   Musculoskeletal: Normal range of motion.   Neurological: She is alert and oriented to person, place, and time.   Skin: No cyanosis. Nails show no clubbing.   Psychiatric: She has a normal mood and affect. Her behavior is normal. Judgment and thought content normal.               Assessment:        1. Vaginal vault prolapse, posthysterectomy    2. Pre-op evaluation    3. Prolapse of anterior vaginal wall            Plan:          Patient consented for laparoscopic assisted robotic sacrocolpopexy with synthetic mesh, possible anterior/posterior repair with perineorrhaphy, possible laparotomy, placement of synthetic midurethral sling, and cystourethroscopy possible sacrospinous ligatment suspension.   R/B/A  reviewed. Specific risks reviewed include:  infection, bleeding, need for blood transfusion, damage to surrounding structures, anesthesia risks, death, heart attack, stroke, mesh erosion/extrusion, pain, dyspareunia, urinary retention, voiding dysfunction, urinary incontinence, exacerbation of urinary urge incontinence, and need for further surgeries.  We reviewed potential for failure of POP defect repair and need for future surgery, with no way of predicting risk.  She understands success rate of ASC approaches 85%.  Success rate of midurethral sling for LIZ was reviewed as 80-85% single incision vs retropubic sling vs transoburator discussed will proceed with single incision, and she understands that this will not necessarily impact other types of urinary incontinence.  Alternatives reviewed include: pessary/PT for POP and pessary/periurethral injections/PT/medication for LIZ. All questions were answered and consents were discussed in detail she has an appointment to meet with my staff to  Sign consents, the paperwork we reviewed was placed in the chart to be signed at that visit.  1. T&S,  DOS  2. Clearance for OR per PCP- pending  3. VTE Prophylaxis:  SCDs  4. Proceed to OR for above-mentioned procedure.  5. Patient instructed on mag citrate chlorahexadine/dial soap prep to perform day before & AM of surgery.       Approximately 35  min were spent in consult, 90 % in discussion.     Vivi Hernández DO  Female Pelvic Medicine and Reconstructive Surgery  [facility] [place], [place]

## 2020-10-07 ENCOUNTER — OFFICE VISIT (OUTPATIENT)
Dept: UROGYNECOLOGY | Facility: CLINIC | Age: 55
End: 2020-10-07
Payer: COMMERCIAL

## 2020-10-07 ENCOUNTER — HOSPITAL ENCOUNTER (OUTPATIENT)
Dept: PREADMISSION TESTING | Facility: OTHER | Age: 55
Discharge: HOME | End: 2020-10-07
Attending: OBSTETRICS & GYNECOLOGY
Payer: COMMERCIAL

## 2020-10-07 ENCOUNTER — ANESTHESIA EVENT (OUTPATIENT)
Dept: SURGERY | Facility: OTHER | Age: 55
End: 2020-10-07
Payer: COMMERCIAL

## 2020-10-07 VITALS
HEIGHT: 63 IN | HEIGHT: 63 IN | SYSTOLIC BLOOD PRESSURE: 113 MMHG | TEMPERATURE: 97 F | OXYGEN SATURATION: 98 % | DIASTOLIC BLOOD PRESSURE: 70 MMHG | WEIGHT: 177 LBS | WEIGHT: 177.94 LBS | HEART RATE: 68 BPM | BODY MASS INDEX: 31.36 KG/M2 | BODY MASS INDEX: 31.53 KG/M2 | RESPIRATION RATE: 16 BRPM

## 2020-10-07 DIAGNOSIS — Z01.818 PRE-OP EVALUATION: ICD-10-CM

## 2020-10-07 DIAGNOSIS — N39.46 MIXED URGE AND STRESS INCONTINENCE: ICD-10-CM

## 2020-10-07 DIAGNOSIS — Z01.818 PRE-OP EVALUATION: Primary | ICD-10-CM

## 2020-10-07 DIAGNOSIS — N99.3 VAGINAL VAULT PROLAPSE, POSTHYSTERECTOMY: Primary | ICD-10-CM

## 2020-10-07 DIAGNOSIS — N81.10 PROLAPSE OF ANTERIOR VAGINAL WALL: ICD-10-CM

## 2020-10-07 PROCEDURE — 99499 NO LOS: ICD-10-PCS | Mod: S$GLB,,, | Performed by: PHYSICIAN ASSISTANT

## 2020-10-07 PROCEDURE — 99999 PR PBB SHADOW E&M-EST. PATIENT-LVL III: ICD-10-PCS | Mod: PBBFAC,,, | Performed by: PHYSICIAN ASSISTANT

## 2020-10-07 PROCEDURE — 99499 UNLISTED E&M SERVICE: CPT | Mod: S$GLB,,, | Performed by: PHYSICIAN ASSISTANT

## 2020-10-07 PROCEDURE — 99999 PR PBB SHADOW E&M-EST. PATIENT-LVL III: CPT | Mod: PBBFAC,,, | Performed by: PHYSICIAN ASSISTANT

## 2020-10-07 RX ORDER — ACETAMINOPHEN 500 MG
1000 TABLET ORAL
Status: CANCELLED | OUTPATIENT
Start: 2020-10-07 | End: 2020-10-07

## 2020-10-07 RX ORDER — FAMOTIDINE 20 MG/1
20 TABLET, FILM COATED ORAL
Status: CANCELLED | OUTPATIENT
Start: 2020-10-07 | End: 2020-10-07

## 2020-10-07 RX ORDER — CELECOXIB 200 MG/1
400 CAPSULE ORAL
Status: CANCELLED | OUTPATIENT
Start: 2020-10-07 | End: 2020-10-07

## 2020-10-07 RX ORDER — LIDOCAINE HYDROCHLORIDE 10 MG/ML
0.5 INJECTION, SOLUTION EPIDURAL; INFILTRATION; INTRACAUDAL; PERINEURAL ONCE
Status: CANCELLED | OUTPATIENT
Start: 2020-10-07 | End: 2020-10-07

## 2020-10-07 RX ORDER — SODIUM CHLORIDE, SODIUM LACTATE, POTASSIUM CHLORIDE, CALCIUM CHLORIDE 600; 310; 30; 20 MG/100ML; MG/100ML; MG/100ML; MG/100ML
INJECTION, SOLUTION INTRAVENOUS CONTINUOUS
Status: CANCELLED | OUTPATIENT
Start: 2020-10-07

## 2020-10-07 NOTE — PROGRESS NOTES
Urogyn PRE-OP  10/07/2020    [facility] UROGYNECOLOGY-[id number]   [address] [address]    Tanesha Burns  [id number]  1965    Tanesha Burns is a 55 y.o.  here for a urogyn pre-op appointment.  Issues include:  Patient Active Problem List   Diagnosis    Psoriasis    Colon polyps    History of abnormal Pap smear    Hypothyroidism due to acquired atrophy of thyroid    Stress incontinence, female    Uterovaginal prolapse, incomplete    Prolapse of anterior vaginal wall    Posterior vaginal wall prolapse    Acute blood loss anemia    Urinary retention with incomplete bladder emptying     HPI: 55 Y O F  has a past medical history of Abnormal Pap smear (), Colon polyps, Encounter for blood transfusion, History of abnormal Pap smear, Hypothyroid, and Psoriasis.    1)  UI:  (+) LIZ  (+) UUI  (--) pads: changes undergarment once a week. Daytime frequency: Q 4 -5 hours.  Nocturia: No:    (-) dysuria-  (--) hematuria,  (--) frequent UTIs.  (+) complete bladder emptying.   2)  POP: Symptoms:(--)  .  (--) vaginal bleeding. (--) vaginal discharge. (+) sexually active.  (--) dyspareunia.   (--)  Vaginal dryness.  (--) vaginal estrogen use..  3)  BM:  (+) constipation/straining.  (--) chronic diarrhea. (--) hematochezia.  (--) fecal incontinence.  (--) fecal smearing/urgency.  (--) incomplete evacuation.    4) Sinus infection- has has this frequently in the plast, currently has bothersome green mucous production and sinus tenderness, Requsting a z-Pac .      Will be out of insurance on . Looking to have something done prior.      2016:   Uterosacral ligament suspension, Cystourethroscopy, Posterior Colporrhaphy, Perinealplasty       Suds: + LIZ      POP-Q  Aa: 1 Ba: 1 C: -1   GH: 3 PB: 2 TVL: 9   Ap: -1 Bp: -1 D:         Past Medical History:   Diagnosis Date    Abnormal Pap smear     cryo    Colon polyps     Encounter for blood transfusion      History of abnormal Pap smear     s/p cryo at age 16    Hypothyroid     Psoriasis         Past Surgical History:   Procedure Laterality Date    BUNIONECTOMY      bilat    COLONOSCOPY N/A 2018    Procedure: COLONOSCOPY;  Surgeon: Evelio García MD;  Location: [facility] ([address] [place] [address]);  Service: Endoscopy;  Laterality: N/A;  Requests Dec 14, 2018    CRYOTHERAPY      FOREARM FRACTURE SURGERY      HYSTERECTOMY      RETINAL LASER PROCEDURE      TEMPOROMANDIBULAR JOINT SURGERY      TUBAL LIGATION      WRIST SURGERY Left      OB   X 2   Largest: 7#   Forceps: no  Episiotomy:  no  Degree: 3rd or 4th no     GYN  Menarche:  11  Menstrual cycle: -5/moderate flow/   Menopause:  50's  Hysterectomy: Yes, MONIK BS, JAZZ   Ovaries present   Tubal ligation: YES  Other abdominal surgeries: none     Review of patient's allergies indicates:  No Known Allergies       Current Outpatient Medications:     levothyroxine (SYNTHROID) 50 MCG tablet, Take 1 tablet (50 mcg total) by mouth once daily., Disp: 90 tablet, Rfl: 3    Social History     Socioeconomic History    Marital status:      Spouse name: Not on file    Number of children: 2    Years of education: Not on file    Highest education level: Not on file   Occupational History    Occupation:     Social Needs    Financial resource strain: Not on file    Food insecurity     Worry: Not on file     Inability: Not on file    Transportation needs     Medical: Not on file     Non-medical: Not on file   Tobacco Use    Smoking status: Never Smoker    Smokeless tobacco: Never Used   Substance and Sexual Activity    Alcohol use: No     Comment: rare    Drug use: No    Sexual activity: Yes     Partners: Male   Lifestyle    Physical activity     Days per week: Not on file     Minutes per session: Not on file    Stress: Not on file   Relationships    Social connections     Talks on phone: Not on file     Gets together: Not on file      "Attends Voodoo service: Not on file     Active member of club or organization: Not on file     Attends meetings of clubs or organizations: Not on file     Relationship status: Not on file   Other Topics Concern    Not on file   Social History Narrative    Not on file      ROS:  As per HPI.      Exam  Ht 5' 3" (1.6 m)   Wt 80.7 kg (177 lb 14.6 oz)   LMP 03/29/2016   BMI 31.52 kg/m²   General: alert and oriented, no acute distress  Respiratory: normal respiratory effort  Abd: soft, non-tender, non-distended    Pelvic  DEFERRED    Impression  1. Vaginal vault prolapse, posthysterectomy     2. Prolapse of anterior vaginal wall     3. Mixed urge and stress incontinence     4. Pre-op evaluation       We reviewed the above issues and discussed options for short-term versus long-term management of her problems.   Plan:   1. Patient consented for robotic-assisted laparoscopic sacrocolpopexy with synthetic mesh, possible anterior/posterior repair with perineorrhaphy, possible laparotomy, placement of synthetic midurethral sling, and cystourethroscopy.   R/B/A reviewed. Specific risks reviewed include:  infection, bleeding, need for blood transfusion, damage to surrounding structures, anesthesia risks, death, heart attack, stroke, mesh erosion/extrusion, pain, dyspareunia, urinary retention, voiding dysfunction, urinary incontinence, exacerbation of urinary urge incontinence, and need for further surgeries.  We reviewed potential for failure of POP defect repair and need for future surgery, with no way of predicting risk.  She understands success rate of ASC approaches 85%.  Success rate of midurethral sling for LIZ was reviewed as 80-85%, and she understands that this will not necessarily impact other types of urinary incontinence.  Alternatives reviewed include: pessary/PT for POP and pessary/periurethral injections/PT/medication for LIZ.    2. T&S, on DOS  3. Preoperative appointment with PCP or cardiology: " No  4. VTE Prophylaxis:SCDs  5. Patient instructed on Magnesium citrate and Chlorahexadine/Dial soap prep to perform day before & AM of surgery.   6. Proceed to OR for above-mentioned procedure.    15 minutes were spent in face to face time with this patient  100 % of this time was spent in counseling and/or coordination of care     Everett Putnam PA-C  [facility]  Division of Female Pelvic Medicine and Reconstructive Surgery  Department of Obstetrics & Gynecology

## 2020-10-07 NOTE — ANESTHESIA PREPROCEDURE EVALUATION
10/07/2020  Jenna Martin is a 55 y.o., female.    Anesthesia Evaluation    I have reviewed the Patient Summary Reports.    I have reviewed the Nursing Notes. I have reviewed the NPO Status.   I have reviewed the Medications.     Review of Systems  Anesthesia Hx:  No problems with previous Anesthesia  Denies Family Hx of Anesthesia complications.   Denies Personal Hx of Anesthesia complications.   Social:  Non-Smoker, No Alcohol Use    Hematology/Oncology:  Hematology Normal   Oncology Normal     EENT/Dental:EENT/Dental Normal   Cardiovascular:  Cardiovascular Normal Exercise tolerance: good     Pulmonary:  Pulmonary Normal    Renal/:  Renal/ Normal  LIZ. Urinary retention.   Hepatic/GI:  Hepatic/GI Normal    Musculoskeletal:  Musculoskeletal Normal    Neurological:  Neurology Normal    Endocrine:   Hypothyroidism Chronic thyroid replacement   Dermatological:   Psoriasis.   Psych:  Psychiatric Normal           Physical Exam  General:  Obesity    Airway/Jaw/Neck:  Airway Findings: Mouth Opening: Normal Tongue: Normal  General Airway Assessment: Adult  Mallampati: I  TM Distance: Normal, at least 6 cm      TMJ with corrective surgery    Dental:  Dental Findings: In tact           Results for JENNA MARTIN (MRN [id number]) as of 10/12/2020 06:22   Ref. Range 8/21/2020 10:34 9/1/2020 08:40 9/15/2020 09:37 10/9/2020 10:28   WBC Latest Ref Range: 3.90 - 12.70 K/uL    6.00   RBC Latest Ref Range: 4.00 - 5.40 M/uL    4.48   Hemoglobin Latest Ref Range: 12.0 - 16.0 g/dL    13.9   Hematocrit Latest Ref Range: 37.0 - 48.5 %    42.1   MCV Latest Ref Range: 82 - 98 fL    94   MCH Latest Ref Range: 27.0 - 31.0 pg    31.0   MCHC Latest Ref Range: 32.0 - 36.0 g/dL    33.0   RDW Latest Ref Range: 11.5 - 14.5 %    12.6   Platelets Latest Ref Range: 150 - 350 K/uL    164       Anesthesia Plan  Type of Anesthesia,  risks & benefits discussed:  Anesthesia Type:  general  Patient's Preference:   Intra-op Monitoring Plan: standard ASA monitors  Intra-op Monitoring Plan Comments:   Post Op Pain Control Plan: per primary service following discharge from PACU and multimodal analgesia  Post Op Pain Control Plan Comments:   Induction:   IV  Beta Blocker:         Informed Consent: Patient understands risks and agrees with Anesthesia plan.  Questions answered. Anesthesia consent signed with patient.  ASA Score: 2     Day of Surgery Review of History & Physical:    H&P update referred to the surgeon.     Anesthesia Plan Notes: Labs in Epic.        Ready For Surgery From Anesthesia Perspective.

## 2020-10-07 NOTE — DISCHARGE INSTRUCTIONS
Information to Prepare you for your Surgery    PRE-ADMIT TESTING -  [phone]    [address]  [place] [facility]          Your surgery has been scheduled at [facility]. We are pleased to have the opportunity to serve you. For Further Information please call [phone].    On the day of surgery please report to the Information Desk on the 1st floor.    · CONTACT YOUR PHYSICIAN'S OFFICE THE DAY PRIOR TO YOUR SURGERY TO OBTAIN YOUR ARRIVAL TIME.     · The evening before surgery do not eat anything after 9 p.m. ( this includes hard candy, chewing gum and mints).  You may only have GATORADE, POWERADE AND WATER  from 9 p.m. until you leave your home.   DO NOT DRINK ANY LIQUIDS ON THE WAY TO THE HOSPITAL.      SPECIAL MEDICATION INSTRUCTIONS: TAKE medications checked off by the Anesthesiologist on your Medication List.    Angiogram Patients: Take medications as instructed by your physician, including aspirin.     Surgery Patients:    If you take ASPIRIN - Your PHYSICIAN/SURGEON will need to inform you IF/OR when you need to stop taking aspirin prior to your surgery.     Do Not take any medications containing IBUPROFEN.  Do Not Wear any make-up or dark nail polish   (especially eye make-up) to surgery. If you come to surgery with makeup on you will be required to remove the makeup or nail polish.    Do not shave your surgical area at least 5 days prior to your surgery. The surgical prep will be performed at the hospital according to Infection Control regulations.    Leave all valuables at home.   Do Not wear any jewelry or watches, including any metal in body piercings. Jewelry must be removed prior to coming to the hospital.  There is a possibility that rings that are unable to be removed may be cut off if they are on the surgical extremity.    Contact Lens must be removed before surgery. Either do not wear the contact lens or bring a case and solution for  storage.  Please bring a container for eyeglasses or dentures as required.  Bring any paperwork your physician has provided, such as consent forms,  history and physicals, doctor's orders, etc.   Bring comfortable clothes that are loose fitting to wear upon discharge. Take into consideration the type of surgery being performed.  Maintain your diet as advised per your physician the day prior to surgery.      Adequate rest the night before surgery is advised.   Park in the Parking lot behind the hospital or in the [facility] across the street from the parking lot. Parking is complimentary.  If you will be discharged the same day as your procedure, please arrange for a responsible adult to drive you home or to accompany you if traveling by taxi.   YOU WILL NOT BE PERMITTED TO DRIVE OR TO LEAVE THE HOSPITAL ALONE AFTER SURGERY.   If you are being discharged the same day, it is strongly recommended that you arrange for someone to remain with you for the first 24 hrs following your surgery.    The Surgeon will speak to your family/visitor after your surgery regarding the outcome of your surgery and post op care.  The Surgeon may speak to you after your surgery, but there is a possibility you may not remember the details.  Please check with your family members regarding the conversation with the Surgeon.    We strongly recommend whoever is bringing you home be present for discharge instructions.  This will ensure a thorough understanding for your post op home care.    ALL CHILDREN MUST ALWAYS BE ACCOMPANIED BY AN ADULT.    Visitors-Refer to current Visitor policy handouts.    Thank you for your cooperation.  The Staff of [facility].                Bathing Instructions with Hibiclens     Shower the evening before and morning of your procedure with Hibiclens:   Wash your face with water and your regular face wash/soap   Apply Hibiclens directly on your skin or on a wet washcloth and wash  gently. When showering: Move away from the shower stream when applying Hibiclens to avoid rinsing off too soon.   Rinse thoroughly with warm water   Do not dilute Hibiclens         Dry off as usual, do not use any deodorant, powder, body lotions, perfume, after shave or cologne.

## 2020-10-09 ENCOUNTER — LAB VISIT (OUTPATIENT)
Dept: URGENT CARE | Facility: CLINIC | Age: 55
End: 2020-10-09
Payer: COMMERCIAL

## 2020-10-09 ENCOUNTER — LAB VISIT (OUTPATIENT)
Dept: LAB | Facility: HOSPITAL | Age: 55
End: 2020-10-09
Attending: INTERNAL MEDICINE
Payer: COMMERCIAL

## 2020-10-09 ENCOUNTER — TELEPHONE (OUTPATIENT)
Dept: UROGYNECOLOGY | Facility: CLINIC | Age: 55
End: 2020-10-09

## 2020-10-09 DIAGNOSIS — Z01.818 PRE-OP EVALUATION: ICD-10-CM

## 2020-10-09 LAB
ALBUMIN SERPL BCP-MCNC: 4 G/DL (ref 3.5–5.2)
ALP SERPL-CCNC: 126 U/L (ref 55–135)
ALT SERPL W/O P-5'-P-CCNC: 12 U/L (ref 10–44)
ANION GAP SERPL CALC-SCNC: 8 MMOL/L (ref 8–16)
AST SERPL-CCNC: 17 U/L (ref 10–40)
BASOPHILS # BLD AUTO: 0.02 K/UL (ref 0–0.2)
BASOPHILS NFR BLD: 0.3 % (ref 0–1.9)
BILIRUB SERPL-MCNC: 0.5 MG/DL (ref 0.1–1)
BUN SERPL-MCNC: 10 MG/DL (ref 6–20)
CALCIUM SERPL-MCNC: 9.3 MG/DL (ref 8.7–10.5)
CHLORIDE SERPL-SCNC: 106 MMOL/L (ref 95–110)
CO2 SERPL-SCNC: 25 MMOL/L (ref 23–29)
CREAT SERPL-MCNC: 0.9 MG/DL (ref 0.5–1.4)
DIFFERENTIAL METHOD: ABNORMAL
EOSINOPHIL # BLD AUTO: 0.1 K/UL (ref 0–0.5)
EOSINOPHIL NFR BLD: 2 % (ref 0–8)
ERYTHROCYTE [DISTWIDTH] IN BLOOD BY AUTOMATED COUNT: 12.6 % (ref 11.5–14.5)
EST. GFR  (AFRICAN AMERICAN): >60 ML/MIN/1.73 M^2
EST. GFR  (NON AFRICAN AMERICAN): >60 ML/MIN/1.73 M^2
GLUCOSE SERPL-MCNC: 101 MG/DL (ref 70–110)
HCT VFR BLD AUTO: 42.1 % (ref 37–48.5)
HGB BLD-MCNC: 13.9 G/DL (ref 12–16)
IMM GRANULOCYTES # BLD AUTO: 0.04 K/UL (ref 0–0.04)
IMM GRANULOCYTES NFR BLD AUTO: 0.7 % (ref 0–0.5)
LYMPHOCYTES # BLD AUTO: 2.1 K/UL (ref 1–4.8)
LYMPHOCYTES NFR BLD: 35 % (ref 18–48)
MCH RBC QN AUTO: 31 PG (ref 27–31)
MCHC RBC AUTO-ENTMCNC: 33 G/DL (ref 32–36)
MCV RBC AUTO: 94 FL (ref 82–98)
MONOCYTES # BLD AUTO: 0.4 K/UL (ref 0.3–1)
MONOCYTES NFR BLD: 7.2 % (ref 4–15)
NEUTROPHILS # BLD AUTO: 3.3 K/UL (ref 1.8–7.7)
NEUTROPHILS NFR BLD: 54.8 % (ref 38–73)
NRBC BLD-RTO: 0 /100 WBC
PLATELET # BLD AUTO: 164 K/UL (ref 150–350)
PMV BLD AUTO: 12 FL (ref 9.2–12.9)
POTASSIUM SERPL-SCNC: 4.1 MMOL/L (ref 3.5–5.1)
PROT SERPL-MCNC: 7.5 G/DL (ref 6–8.4)
RBC # BLD AUTO: 4.48 M/UL (ref 4–5.4)
SARS-COV-2 RNA RESP QL NAA+PROBE: NOT DETECTED
SODIUM SERPL-SCNC: 139 MMOL/L (ref 136–145)
WBC # BLD AUTO: 6 K/UL (ref 3.9–12.7)

## 2020-10-09 PROCEDURE — 99211 PR OFFICE/OUTPT VISIT, EST, LEVL I: ICD-10-PCS | Mod: S$GLB,,, | Performed by: INTERNAL MEDICINE

## 2020-10-09 PROCEDURE — U0003 INFECTIOUS AGENT DETECTION BY NUCLEIC ACID (DNA OR RNA); SEVERE ACUTE RESPIRATORY SYNDROME CORONAVIRUS 2 (SARS-COV-2) (CORONAVIRUS DISEASE [COVID-19]), AMPLIFIED PROBE TECHNIQUE, MAKING USE OF HIGH THROUGHPUT TECHNOLOGIES AS DESCRIBED BY CMS-2020-01-R: HCPCS

## 2020-10-09 PROCEDURE — 36415 COLL VENOUS BLD VENIPUNCTURE: CPT | Mod: PO

## 2020-10-09 PROCEDURE — 99211 OFF/OP EST MAY X REQ PHY/QHP: CPT | Mod: S$GLB,,, | Performed by: INTERNAL MEDICINE

## 2020-10-09 PROCEDURE — 85025 COMPLETE CBC W/AUTO DIFF WBC: CPT

## 2020-10-09 PROCEDURE — 80053 COMPREHEN METABOLIC PANEL: CPT

## 2020-10-12 ENCOUNTER — ANESTHESIA (OUTPATIENT)
Dept: SURGERY | Facility: OTHER | Age: 55
End: 2020-10-12
Payer: COMMERCIAL

## 2020-10-12 ENCOUNTER — PATIENT MESSAGE (OUTPATIENT)
Dept: UROGYNECOLOGY | Facility: CLINIC | Age: 55
End: 2020-10-12

## 2020-10-12 ENCOUNTER — HOSPITAL ENCOUNTER (OUTPATIENT)
Facility: OTHER | Age: 55
Discharge: HOME | End: 2020-10-12
Attending: OBSTETRICS & GYNECOLOGY | Admitting: OBSTETRICS & GYNECOLOGY
Payer: COMMERCIAL

## 2020-10-12 VITALS
TEMPERATURE: 98 F | HEIGHT: 63 IN | DIASTOLIC BLOOD PRESSURE: 59 MMHG | WEIGHT: 177.94 LBS | SYSTOLIC BLOOD PRESSURE: 105 MMHG | HEART RATE: 63 BPM | RESPIRATION RATE: 18 BRPM | OXYGEN SATURATION: 98 % | BODY MASS INDEX: 31.53 KG/M2

## 2020-10-12 DIAGNOSIS — N81.9 VAGINAL VAULT PROLAPSE: Primary | ICD-10-CM

## 2020-10-12 DIAGNOSIS — Z98.890 POST-OPERATIVE STATE: ICD-10-CM

## 2020-10-12 LAB
ABO + RH BLD: NORMAL
ANION GAP SERPL CALC-SCNC: 11 MMOL/L (ref 8–16)
BASOPHILS # BLD AUTO: 0.01 K/UL (ref 0–0.2)
BASOPHILS NFR BLD: 0.1 % (ref 0–1.9)
BLD GP AB SCN CELLS X3 SERPL QL: NORMAL
BUN SERPL-MCNC: 14 MG/DL (ref 6–20)
CALCIUM SERPL-MCNC: 8.7 MG/DL (ref 8.7–10.5)
CHLORIDE SERPL-SCNC: 107 MMOL/L (ref 95–110)
CO2 SERPL-SCNC: 20 MMOL/L (ref 23–29)
CREAT SERPL-MCNC: 0.9 MG/DL (ref 0.5–1.4)
DIFFERENTIAL METHOD: ABNORMAL
EOSINOPHIL # BLD AUTO: 0 K/UL (ref 0–0.5)
EOSINOPHIL NFR BLD: 0.1 % (ref 0–8)
ERYTHROCYTE [DISTWIDTH] IN BLOOD BY AUTOMATED COUNT: 12.3 % (ref 11.5–14.5)
EST. GFR  (AFRICAN AMERICAN): >60 ML/MIN/1.73 M^2
EST. GFR  (NON AFRICAN AMERICAN): >60 ML/MIN/1.73 M^2
GLUCOSE SERPL-MCNC: 142 MG/DL (ref 70–110)
HCT VFR BLD AUTO: 38.4 % (ref 37–48.5)
HGB BLD-MCNC: 12.9 G/DL (ref 12–16)
IMM GRANULOCYTES # BLD AUTO: 0.07 K/UL (ref 0–0.04)
IMM GRANULOCYTES NFR BLD AUTO: 0.7 % (ref 0–0.5)
LYMPHOCYTES # BLD AUTO: 0.7 K/UL (ref 1–4.8)
LYMPHOCYTES NFR BLD: 7.5 % (ref 18–48)
MCH RBC QN AUTO: 30.7 PG (ref 27–31)
MCHC RBC AUTO-ENTMCNC: 33.6 G/DL (ref 32–36)
MCV RBC AUTO: 91 FL (ref 82–98)
MONOCYTES # BLD AUTO: 0.1 K/UL (ref 0.3–1)
MONOCYTES NFR BLD: 1.4 % (ref 4–15)
NEUTROPHILS # BLD AUTO: 8.9 K/UL (ref 1.8–7.7)
NEUTROPHILS NFR BLD: 90.2 % (ref 38–73)
NRBC BLD-RTO: 0 /100 WBC
PLATELET # BLD AUTO: 149 K/UL (ref 150–350)
PMV BLD AUTO: 11.5 FL (ref 9.2–12.9)
POTASSIUM SERPL-SCNC: 4.1 MMOL/L (ref 3.5–5.1)
RBC # BLD AUTO: 4.2 M/UL (ref 4–5.4)
SODIUM SERPL-SCNC: 138 MMOL/L (ref 136–145)
WBC # BLD AUTO: 9.83 K/UL (ref 3.9–12.7)

## 2020-10-12 PROCEDURE — 37000009 HC ANESTHESIA EA ADD 15 MINS: Performed by: OBSTETRICS & GYNECOLOGY

## 2020-10-12 PROCEDURE — 63600175 PHARM REV CODE 636 W HCPCS: Performed by: OBSTETRICS & GYNECOLOGY

## 2020-10-12 PROCEDURE — 57288 REPAIR BLADDER DEFECT: CPT | Mod: AS,51,, | Performed by: PHYSICIAN ASSISTANT

## 2020-10-12 PROCEDURE — 63600175 PHARM REV CODE 636 W HCPCS: Performed by: NURSE ANESTHETIST, CERTIFIED REGISTERED

## 2020-10-12 PROCEDURE — 57425 LAPAROSCOPY SURG COLPOPEXY: CPT | Mod: ,,, | Performed by: OBSTETRICS & GYNECOLOGY

## 2020-10-12 PROCEDURE — C1765 ADHESION BARRIER: HCPCS | Performed by: OBSTETRICS & GYNECOLOGY

## 2020-10-12 PROCEDURE — C1781 MESH (IMPLANTABLE): HCPCS | Performed by: OBSTETRICS & GYNECOLOGY

## 2020-10-12 PROCEDURE — 85025 COMPLETE CBC W/AUTO DIFF WBC: CPT

## 2020-10-12 PROCEDURE — S0020 INJECTION, BUPIVICAINE HYDRO: HCPCS | Performed by: OBSTETRICS & GYNECOLOGY

## 2020-10-12 PROCEDURE — 57425 PR LAPAROSCOPY, SURG, COLPOPEXY: ICD-10-PCS | Mod: ,,, | Performed by: OBSTETRICS & GYNECOLOGY

## 2020-10-12 PROCEDURE — 25000003 PHARM REV CODE 250: Performed by: NURSE ANESTHETIST, CERTIFIED REGISTERED

## 2020-10-12 PROCEDURE — 88305 TISSUE EXAM BY PATHOLOGIST: CPT | Mod: 26,,, | Performed by: PATHOLOGY

## 2020-10-12 PROCEDURE — 57288 PR SLING OPER STRES INCONTINENCE: ICD-10-PCS | Mod: AS,51,, | Performed by: PHYSICIAN ASSISTANT

## 2020-10-12 PROCEDURE — P9045 ALBUMIN (HUMAN), 5%, 250 ML: HCPCS | Mod: JG | Performed by: NURSE ANESTHETIST, CERTIFIED REGISTERED

## 2020-10-12 PROCEDURE — 25000003 PHARM REV CODE 250: Performed by: ANESTHESIOLOGY

## 2020-10-12 PROCEDURE — 36415 COLL VENOUS BLD VENIPUNCTURE: CPT

## 2020-10-12 PROCEDURE — 71000039 HC RECOVERY, EACH ADD'L HOUR: Performed by: OBSTETRICS & GYNECOLOGY

## 2020-10-12 PROCEDURE — 71000016 HC POSTOP RECOV ADDL HR: Performed by: OBSTETRICS & GYNECOLOGY

## 2020-10-12 PROCEDURE — 57288 REPAIR BLADDER DEFECT: CPT | Mod: 51,,, | Performed by: OBSTETRICS & GYNECOLOGY

## 2020-10-12 PROCEDURE — 57288 PR SLING OPER STRES INCONTINENCE: ICD-10-PCS | Mod: 51,,, | Performed by: OBSTETRICS & GYNECOLOGY

## 2020-10-12 PROCEDURE — 57425 PR LAPAROSCOPY, SURG, COLPOPEXY: ICD-10-PCS | Mod: AS,,, | Performed by: PHYSICIAN ASSISTANT

## 2020-10-12 PROCEDURE — 36000713 HC OR TIME LEV V EA ADD 15 MIN: Performed by: OBSTETRICS & GYNECOLOGY

## 2020-10-12 PROCEDURE — 25000003 PHARM REV CODE 250: Performed by: SPECIALIST

## 2020-10-12 PROCEDURE — 88305 TISSUE EXAM BY PATHOLOGIST: ICD-10-PCS | Mod: 26,,, | Performed by: PATHOLOGY

## 2020-10-12 PROCEDURE — 80048 BASIC METABOLIC PNL TOTAL CA: CPT

## 2020-10-12 PROCEDURE — 36000712 HC OR TIME LEV V 1ST 15 MIN: Performed by: OBSTETRICS & GYNECOLOGY

## 2020-10-12 PROCEDURE — 25000003 PHARM REV CODE 250: Performed by: OBSTETRICS & GYNECOLOGY

## 2020-10-12 PROCEDURE — 71000015 HC POSTOP RECOV 1ST HR: Performed by: OBSTETRICS & GYNECOLOGY

## 2020-10-12 PROCEDURE — 37000008 HC ANESTHESIA 1ST 15 MINUTES: Performed by: OBSTETRICS & GYNECOLOGY

## 2020-10-12 PROCEDURE — S0030 INJECTION, METRONIDAZOLE: HCPCS | Performed by: PHYSICIAN ASSISTANT

## 2020-10-12 PROCEDURE — 71000033 HC RECOVERY, INTIAL HOUR: Performed by: OBSTETRICS & GYNECOLOGY

## 2020-10-12 PROCEDURE — 27201423 OPTIME MED/SURG SUP & DEVICES STERILE SUPPLY: Performed by: OBSTETRICS & GYNECOLOGY

## 2020-10-12 PROCEDURE — 88305 TISSUE EXAM BY PATHOLOGIST: CPT | Performed by: PATHOLOGY

## 2020-10-12 PROCEDURE — 57425 LAPAROSCOPY SURG COLPOPEXY: CPT | Mod: AS,,, | Performed by: PHYSICIAN ASSISTANT

## 2020-10-12 PROCEDURE — 63600175 PHARM REV CODE 636 W HCPCS: Performed by: PHYSICIAN ASSISTANT

## 2020-10-12 PROCEDURE — 86901 BLOOD TYPING SEROLOGIC RH(D): CPT

## 2020-10-12 PROCEDURE — 25000003 PHARM REV CODE 250: Performed by: PHYSICIAN ASSISTANT

## 2020-10-12 PROCEDURE — C1771 REP DEV, URINARY, W/SLING: HCPCS | Performed by: OBSTETRICS & GYNECOLOGY

## 2020-10-12 PROCEDURE — 63600175 PHARM REV CODE 636 W HCPCS: Performed by: SPECIALIST

## 2020-10-12 DEVICE — MESH RESTORELLE Y CONTOUR 24X3: Type: IMPLANTABLE DEVICE | Site: VAGINA | Status: FUNCTIONAL

## 2020-10-12 DEVICE — BARRIER GYNECARE INTCEED ABSRB: Type: IMPLANTABLE DEVICE | Site: VAGINA | Status: FUNCTIONAL

## 2020-10-12 DEVICE — SLING ALTIS SINGLE INCISION: Type: IMPLANTABLE DEVICE | Site: BLADDER | Status: FUNCTIONAL

## 2020-10-12 RX ORDER — LEVOTHYROXINE SODIUM 50 UG/1
50 TABLET ORAL DAILY
Status: CANCELLED | OUTPATIENT
Start: 2020-10-12

## 2020-10-12 RX ORDER — ACETAMINOPHEN 500 MG
1000 TABLET ORAL
Status: COMPLETED | OUTPATIENT
Start: 2020-10-12 | End: 2020-10-12

## 2020-10-12 RX ORDER — ROCURONIUM BROMIDE 10 MG/ML
INJECTION, SOLUTION INTRAVENOUS
Status: DISCONTINUED | OUTPATIENT
Start: 2020-10-12 | End: 2020-10-12

## 2020-10-12 RX ORDER — FENTANYL CITRATE 50 UG/ML
INJECTION, SOLUTION INTRAMUSCULAR; INTRAVENOUS
Status: DISCONTINUED | OUTPATIENT
Start: 2020-10-12 | End: 2020-10-12

## 2020-10-12 RX ORDER — DEXAMETHASONE SODIUM PHOSPHATE 4 MG/ML
INJECTION, SOLUTION INTRA-ARTICULAR; INTRALESIONAL; INTRAMUSCULAR; INTRAVENOUS; SOFT TISSUE
Status: DISCONTINUED | OUTPATIENT
Start: 2020-10-12 | End: 2020-10-12

## 2020-10-12 RX ORDER — DEXTROSE MONOHYDRATE, SODIUM CHLORIDE, AND POTASSIUM CHLORIDE 50; 1.49; 4.5 G/1000ML; G/1000ML; G/1000ML
INJECTION, SOLUTION INTRAVENOUS CONTINUOUS
Status: CANCELLED | OUTPATIENT
Start: 2020-10-12

## 2020-10-12 RX ORDER — NEOSTIGMINE METHYLSULFATE 1 MG/ML
INJECTION, SOLUTION INTRAVENOUS
Status: DISCONTINUED | OUTPATIENT
Start: 2020-10-12 | End: 2020-10-12

## 2020-10-12 RX ORDER — SODIUM CHLORIDE 9 MG/ML
INJECTION, SOLUTION INTRAVENOUS CONTINUOUS
Status: DISCONTINUED | OUTPATIENT
Start: 2020-10-12 | End: 2020-10-12 | Stop reason: HOSPADM

## 2020-10-12 RX ORDER — SODIUM CHLORIDE 0.9 % (FLUSH) 0.9 %
3 SYRINGE (ML) INJECTION
Status: DISCONTINUED | OUTPATIENT
Start: 2020-10-12 | End: 2020-10-12 | Stop reason: HOSPADM

## 2020-10-12 RX ORDER — CELECOXIB 200 MG/1
400 CAPSULE ORAL
Status: COMPLETED | OUTPATIENT
Start: 2020-10-12 | End: 2020-10-12

## 2020-10-12 RX ORDER — OXYCODONE AND ACETAMINOPHEN 10; 325 MG/1; MG/1
1 TABLET ORAL EVERY 4 HOURS PRN
Status: CANCELLED | OUTPATIENT
Start: 2020-10-12

## 2020-10-12 RX ORDER — HYDROMORPHONE HYDROCHLORIDE 1 MG/ML
1 INJECTION, SOLUTION INTRAMUSCULAR; INTRAVENOUS; SUBCUTANEOUS EVERY 6 HOURS PRN
Status: CANCELLED | OUTPATIENT
Start: 2020-10-12

## 2020-10-12 RX ORDER — ONDANSETRON 2 MG/ML
4 INJECTION INTRAMUSCULAR; INTRAVENOUS DAILY PRN
Status: DISCONTINUED | OUTPATIENT
Start: 2020-10-12 | End: 2020-10-12 | Stop reason: HOSPADM

## 2020-10-12 RX ORDER — CEFAZOLIN SODIUM 2 G/50ML
2 SOLUTION INTRAVENOUS
Status: COMPLETED | OUTPATIENT
Start: 2020-10-12 | End: 2020-10-12

## 2020-10-12 RX ORDER — CEFOXITIN SODIUM 1 G/50ML
1 INJECTION, SOLUTION INTRAVENOUS
Status: DISCONTINUED | OUTPATIENT
Start: 2020-10-12 | End: 2020-10-12 | Stop reason: HOSPADM

## 2020-10-12 RX ORDER — DIPHENHYDRAMINE HCL 25 MG
25 CAPSULE ORAL EVERY 4 HOURS PRN
Status: DISCONTINUED | OUTPATIENT
Start: 2020-10-12 | End: 2020-10-12 | Stop reason: HOSPADM

## 2020-10-12 RX ORDER — OXYCODONE AND ACETAMINOPHEN 5; 325 MG/1; MG/1
1 TABLET ORAL EVERY 4 HOURS PRN
Status: CANCELLED | OUTPATIENT
Start: 2020-10-12

## 2020-10-12 RX ORDER — ALBUMIN HUMAN 50 G/1000ML
SOLUTION INTRAVENOUS CONTINUOUS PRN
Status: DISCONTINUED | OUTPATIENT
Start: 2020-10-12 | End: 2020-10-12

## 2020-10-12 RX ORDER — MIDAZOLAM HYDROCHLORIDE 1 MG/ML
INJECTION INTRAMUSCULAR; INTRAVENOUS
Status: DISCONTINUED | OUTPATIENT
Start: 2020-10-12 | End: 2020-10-12

## 2020-10-12 RX ORDER — FAMOTIDINE 20 MG/1
20 TABLET, FILM COATED ORAL
Status: COMPLETED | OUTPATIENT
Start: 2020-10-12 | End: 2020-10-12

## 2020-10-12 RX ORDER — VASOPRESSIN 20 [USP'U]/ML
INJECTION, SOLUTION INTRAMUSCULAR; SUBCUTANEOUS
Status: DISCONTINUED | OUTPATIENT
Start: 2020-10-12 | End: 2020-10-12 | Stop reason: HOSPADM

## 2020-10-12 RX ORDER — ENOXAPARIN SODIUM 100 MG/ML
40 INJECTION SUBCUTANEOUS EVERY 24 HOURS
Status: CANCELLED | OUTPATIENT
Start: 2020-10-12

## 2020-10-12 RX ORDER — PROPOFOL 10 MG/ML
VIAL (ML) INTRAVENOUS
Status: DISCONTINUED | OUTPATIENT
Start: 2020-10-12 | End: 2020-10-12

## 2020-10-12 RX ORDER — ONDANSETRON 2 MG/ML
INJECTION INTRAMUSCULAR; INTRAVENOUS
Status: DISCONTINUED | OUTPATIENT
Start: 2020-10-12 | End: 2020-10-12

## 2020-10-12 RX ORDER — HYDROMORPHONE HYDROCHLORIDE 2 MG/ML
0.4 INJECTION, SOLUTION INTRAMUSCULAR; INTRAVENOUS; SUBCUTANEOUS EVERY 5 MIN PRN
Status: DISCONTINUED | OUTPATIENT
Start: 2020-10-12 | End: 2020-10-12 | Stop reason: HOSPADM

## 2020-10-12 RX ORDER — ONDANSETRON 8 MG/1
8 TABLET, ORALLY DISINTEGRATING ORAL EVERY 8 HOURS PRN
Status: CANCELLED | OUTPATIENT
Start: 2020-10-12

## 2020-10-12 RX ORDER — METRONIDAZOLE 500 MG/100ML
500 INJECTION, SOLUTION INTRAVENOUS
Status: DISCONTINUED | OUTPATIENT
Start: 2020-10-12 | End: 2020-10-12 | Stop reason: HOSPADM

## 2020-10-12 RX ORDER — DIPHENHYDRAMINE HYDROCHLORIDE 50 MG/ML
INJECTION INTRAMUSCULAR; INTRAVENOUS
Status: DISCONTINUED | OUTPATIENT
Start: 2020-10-12 | End: 2020-10-12

## 2020-10-12 RX ORDER — LIDOCAINE HYDROCHLORIDE 10 MG/ML
0.5 INJECTION, SOLUTION EPIDURAL; INFILTRATION; INTRACAUDAL; PERINEURAL ONCE
Status: DISCONTINUED | OUTPATIENT
Start: 2020-10-12 | End: 2020-10-12 | Stop reason: HOSPADM

## 2020-10-12 RX ORDER — DIPHENHYDRAMINE HYDROCHLORIDE 50 MG/ML
25 INJECTION INTRAMUSCULAR; INTRAVENOUS EVERY 4 HOURS PRN
Status: DISCONTINUED | OUTPATIENT
Start: 2020-10-12 | End: 2020-10-12 | Stop reason: HOSPADM

## 2020-10-12 RX ORDER — DOCUSATE SODIUM 100 MG/1
100 CAPSULE, LIQUID FILLED ORAL 2 TIMES DAILY
Status: CANCELLED | OUTPATIENT
Start: 2020-10-12

## 2020-10-12 RX ORDER — SODIUM CHLORIDE, SODIUM LACTATE, POTASSIUM CHLORIDE, CALCIUM CHLORIDE 600; 310; 30; 20 MG/100ML; MG/100ML; MG/100ML; MG/100ML
INJECTION, SOLUTION INTRAVENOUS CONTINUOUS
Status: DISCONTINUED | OUTPATIENT
Start: 2020-10-12 | End: 2020-10-12 | Stop reason: HOSPADM

## 2020-10-12 RX ORDER — ENOXAPARIN SODIUM 100 MG/ML
40 INJECTION SUBCUTANEOUS EVERY 24 HOURS
Status: DISCONTINUED | OUTPATIENT
Start: 2020-10-12 | End: 2020-10-12 | Stop reason: HOSPADM

## 2020-10-12 RX ORDER — OXYCODONE HYDROCHLORIDE 5 MG/1
5 TABLET ORAL
Status: DISCONTINUED | OUTPATIENT
Start: 2020-10-12 | End: 2020-10-12 | Stop reason: HOSPADM

## 2020-10-12 RX ORDER — KETOROLAC TROMETHAMINE 30 MG/ML
INJECTION, SOLUTION INTRAMUSCULAR; INTRAVENOUS
Status: DISCONTINUED | OUTPATIENT
Start: 2020-10-12 | End: 2020-10-12

## 2020-10-12 RX ORDER — LIDOCAINE HYDROCHLORIDE 20 MG/ML
INJECTION INTRAVENOUS
Status: DISCONTINUED | OUTPATIENT
Start: 2020-10-12 | End: 2020-10-12

## 2020-10-12 RX ORDER — IBUPROFEN 600 MG/1
600 TABLET ORAL EVERY 6 HOURS
Status: DISCONTINUED | OUTPATIENT
Start: 2020-10-12 | End: 2020-10-12 | Stop reason: HOSPADM

## 2020-10-12 RX ORDER — MEPERIDINE HYDROCHLORIDE 25 MG/ML
12.5 INJECTION INTRAMUSCULAR; INTRAVENOUS; SUBCUTANEOUS ONCE AS NEEDED
Status: DISCONTINUED | OUTPATIENT
Start: 2020-10-12 | End: 2020-10-12 | Stop reason: HOSPADM

## 2020-10-12 RX ORDER — BUPIVACAINE HYDROCHLORIDE 5 MG/ML
INJECTION, SOLUTION EPIDURAL; INTRACAUDAL
Status: DISCONTINUED | OUTPATIENT
Start: 2020-10-12 | End: 2020-10-12 | Stop reason: HOSPADM

## 2020-10-12 RX ADMIN — KETOROLAC TROMETHAMINE 30 MG: 30 INJECTION, SOLUTION INTRAMUSCULAR; INTRAVENOUS at 11:10

## 2020-10-12 RX ADMIN — GLYCOPYRROLATE 0.2 MG: 0.2 INJECTION, SOLUTION INTRAMUSCULAR; INTRAVITREAL at 07:10

## 2020-10-12 RX ADMIN — ONDANSETRON HYDROCHLORIDE 4 MG: 2 INJECTION INTRAMUSCULAR; INTRAVENOUS at 07:10

## 2020-10-12 RX ADMIN — METRONIDAZOLE 500 MG: 500 SOLUTION INTRAVENOUS at 12:10

## 2020-10-12 RX ADMIN — FENTANYL CITRATE 50 MCG: 50 INJECTION, SOLUTION INTRAMUSCULAR; INTRAVENOUS at 08:10

## 2020-10-12 RX ADMIN — OXYCODONE 5 MG: 5 TABLET ORAL at 03:10

## 2020-10-12 RX ADMIN — ALBUMIN (HUMAN): 12.5 SOLUTION INTRAVENOUS at 08:10

## 2020-10-12 RX ADMIN — CELECOXIB 400 MG: 200 CAPSULE ORAL at 05:10

## 2020-10-12 RX ADMIN — NEOSTIGMINE METHYLSULFATE 5 MG: 1 INJECTION INTRAVENOUS at 11:10

## 2020-10-12 RX ADMIN — CEFOXITIN SODIUM 1 G: 1 INJECTION, SOLUTION INTRAVENOUS at 12:10

## 2020-10-12 RX ADMIN — DEXAMETHASONE SODIUM PHOSPHATE 8 MG: 4 INJECTION, SOLUTION INTRAMUSCULAR; INTRAVENOUS at 07:10

## 2020-10-12 RX ADMIN — ROCURONIUM BROMIDE 50 MG: 10 SOLUTION INTRAVENOUS at 07:10

## 2020-10-12 RX ADMIN — ENOXAPARIN SODIUM 40 MG: 100 INJECTION SUBCUTANEOUS at 04:10

## 2020-10-12 RX ADMIN — LIDOCAINE HYDROCHLORIDE 75 MG: 20 INJECTION, SOLUTION INTRAVENOUS at 07:10

## 2020-10-12 RX ADMIN — GLYCOPYRROLATE 0.6 MG: 0.2 INJECTION, SOLUTION INTRAMUSCULAR; INTRAVITREAL at 11:10

## 2020-10-12 RX ADMIN — ACETAMINOPHEN 1000 MG: 500 TABLET, FILM COATED ORAL at 05:10

## 2020-10-12 RX ADMIN — MIDAZOLAM HYDROCHLORIDE 2 MG: 1 INJECTION, SOLUTION INTRAMUSCULAR; INTRAVENOUS at 07:10

## 2020-10-12 RX ADMIN — INDIGO CARMINE 5 ML: 8 INJECTION, SOLUTION INTRAMUSCULAR; INTRAVENOUS at 10:10

## 2020-10-12 RX ADMIN — FAMOTIDINE 20 MG: 20 TABLET ORAL at 05:10

## 2020-10-12 RX ADMIN — FENTANYL CITRATE 100 MCG: 50 INJECTION, SOLUTION INTRAMUSCULAR; INTRAVENOUS at 07:10

## 2020-10-12 RX ADMIN — SODIUM CHLORIDE, SODIUM LACTATE, POTASSIUM CHLORIDE, AND CALCIUM CHLORIDE: 600; 310; 30; 20 INJECTION, SOLUTION INTRAVENOUS at 10:10

## 2020-10-12 RX ADMIN — PROPOFOL 150 MG: 10 INJECTION, EMULSION INTRAVENOUS at 07:10

## 2020-10-12 RX ADMIN — CEFAZOLIN SODIUM 2 G: 2 SOLUTION INTRAVENOUS at 07:10

## 2020-10-12 RX ADMIN — OXYCODONE 5 MG: 5 TABLET ORAL at 12:10

## 2020-10-12 RX ADMIN — ROCURONIUM BROMIDE 10 MG: 10 SOLUTION INTRAVENOUS at 09:10

## 2020-10-12 RX ADMIN — FENTANYL CITRATE 50 MCG: 50 INJECTION, SOLUTION INTRAMUSCULAR; INTRAVENOUS at 07:10

## 2020-10-12 RX ADMIN — FENTANYL CITRATE 50 MCG: 50 INJECTION, SOLUTION INTRAMUSCULAR; INTRAVENOUS at 11:10

## 2020-10-12 RX ADMIN — PROPOFOL 20 MG: 10 INJECTION, EMULSION INTRAVENOUS at 07:10

## 2020-10-12 RX ADMIN — SODIUM CHLORIDE, SODIUM LACTATE, POTASSIUM CHLORIDE, AND CALCIUM CHLORIDE: 600; 310; 30; 20 INJECTION, SOLUTION INTRAVENOUS at 06:10

## 2020-10-12 RX ADMIN — DIPHENHYDRAMINE HYDROCHLORIDE 12.5 MG: 50 INJECTION, SOLUTION INTRAMUSCULAR; INTRAVENOUS at 07:10

## 2020-10-12 RX ADMIN — ROCURONIUM BROMIDE 10 MG: 10 SOLUTION INTRAVENOUS at 07:10

## 2020-10-12 RX ADMIN — ROCURONIUM BROMIDE 10 MG: 10 SOLUTION INTRAVENOUS at 08:10

## 2020-10-12 RX ADMIN — LIGHT MINERAL OIL, WHITE PETROLATUM 0.2 ML: 150; 850 OINTMENT OPHTHALMIC at 07:10

## 2020-10-12 NOTE — DISCHARGE INSTRUCTIONS
Anesthesia: After Your Surgery  Youve just had surgery. During surgery, you received medication called anesthesia to keep you comfortable and pain-free. After surgery, you may experience some pain or nausea. This is common. Here are some tips for feeling better and recovering after surgery.    Going home  Your doctor or nurse will show you how to take care of yourself when you go home. He or she will also answer your questions. Have an adult family member or friend drive you home. For the first 24 hours after your surgery:  · Do not drive or use heavy equipment.  · Do not make important decisions or sign legal documents.  · Avoid alcohol.  · Have someone stay with you, if needed. He or she can watch for problems and help keep you safe.  · Take your time getting up from a seated or lying position. You may experience dizziness for 24 hours  Be sure to keep all follow-up appointments with your doctor. And rest after your procedure for as long as your doctor tells you to.    Coping with pain  If you have pain after surgery, pain medication will help you feel better. Take it as directed, before pain becomes severe. Also, ask your doctor or pharmacist about other ways to control pain, such as with heat, ice, and relaxation. And follow any other instructions your surgeon or nurse gives you.    URINARY RETENTION  Should you experience a decrease in your urine output or are unable to urinate following surgery, this can be due to the medications given during surgery.  We recommend you going to the nearest Emergency Department.    Tips for taking pain medication  To get the best relief possible, remember these points:  · Pain medications can upset your stomach. Taking them with a little food may help.  · Most pain relievers taken by mouth need at least 20 to 30 minutes to take effect.  · Taking medication on a schedule can help you remember to take it. Try to time your medication so that you can take it before beginning an  activity, such as dressing, walking, or sitting down for dinner.  · Constipation is a common side effect of pain medications. Contact your doctor before taking any medications like laxatives or stool softeners to help relieve constipation. Also ask about any dietary restrictions, because drinking lots of fluids and eating foods like fruits and vegetables that are high in fiber can also help. Remember, dont take laxatives unless your surgeon has prescribed them.  · Mixing alcohol and pain medication can cause dizziness and slow your breathing. It can even be fatal. Dont drink alcohol while taking pain medication.  · Pain medication can slow your reflexes. Dont drive or operate machinery while taking pain medication.  If your health care provider tells you to take acetaminophen to help relieve your pain, ask him or her how much you are supposed to take each day. (Acetaminophen is the generic name for Tylenol and other brand-name pain relievers.) Acetaminophen or other pain relievers may interact with your prescription medicines or other over-the-counter (OTC) drugs. Some prescription medications contain acetaminophen along with other active ingredients. Using both prescription and OTC acetaminophen for pain can cause you to overdose. The FDA recommends that you read the labels on your OTC medications carefully. This will help you to clearly understand the list of active ingredients, dosing instructions, and any warnings. It may also help you avoid taking too much acetaminophen. If you have questions or don't understand the information, ask your pharmacist or health care provider to explain it to you before you take the OTC medication.    Managing nausea  Some people have an upset stomach after surgery. This is often due to anesthesia, pain, pain medications, or the stress of surgery. The following tips will help you manage nausea and get good nutrition as you recover. If you were on a special diet before surgery,  ask your doctor if you should follow it during recovery. These tips may help:  · Dont push yourself to eat. Your body will tell you when to eat and how much.  · Start off with clear liquids and soup. They are easier to digest.  · Progress to semi-solid foods (mashed potatoes, applesauce, and gelatin) as you feel ready.  · Slowly move to solid foods. Dont eat fatty, rich, or spicy foods at first.  · Dont force yourself to have three large meals a day. Instead, eat smaller amounts more often.  · Take pain medications with a small amount of solid food, such as crackers or toast to avoid nausea.      Call your surgeon if    · You feel too sleepy, dizzy, or groggy (medication may be too strong).  · You have side effects like nausea, vomiting, or skin changes (rash, itching, or hives).   © 6689-0861 [employer]. [address] [place] [address]. All rights reserved. This information is not intended as a substitute for professional medical care. Always follow your healthcare professional's instructions.        Thurman Catheter Care    A Thurman catheter is a rubber tube that is placed through the urethra (opening where urine comes out) and into the bladder. This helps drain urine from the bladder. There is a small balloon on the end of the tube that is inflated after insertion. This keeps the catheter from sliding out of the bladder.  A Thurman catheter is used to treat urinary retention (unable to pass urine). It is also used when there is incontinence (loss of bladder control).      Home care  · Finish taking any prescribed antibiotic even if you are feeling better before then.  · It is important to keep bacteria from getting into the collection bag. Do not disconnect the catheter from the collection bag.  · Use a leg band to secure the drainage tube, so it does not pull on the catheter. Drain the collection bag when it becomes full using the drain spout at the bottom of the bag.  · Do not try to  pull or remove your catheter. This will injure your urethra. It must be removed by your healthcare provider or nurse.      Follow-up care  Follow up with your healthcare provider as advised for repeat urine testing and catheter removal or replacement...    When to seek medical advice  Call your healthcare provider right away if any of these occur:  · Fever of 100.4ºF (38ºC) or higher, or as directed by your healthcare provider  · Bladder pain or fullness  · Abdominal swelling, nausea or vomiting, or back pain  · Blood or urine leakage around the catheter  · Bloody urine coming from the catheter (if a new symptom)  · Catheter falls out  · Catheter stops draining for 6 hours  · Weakness, dizziness, or fainting    Abdominal Laparoscopy Discharge Instructions      Activity  · Limit your activity for 4-6 weeks.  · Dont lift anything heavier than 5-10 pounds.  · Avoid strenuous activities, such as mowing the lawn, vacuuming, or playing sports.  · Limit your activity to short, slow walks. Gradually increase your pace and distance as you feel able.  · Listen to your body. If an activity causes pain, stop.  · Rest when you are tired.  · Dont have sexual intercourse or use tampons or douches until your doctor says its safe to do so.    Home Care  · Always keep your incision clean and dry  · Shower as instructed in 24 hours. You may wash your incision gently with mild soap and warm water and pat dry.  Avoid tub baths, hot tubs and swimming pools until seen by your physician for a post-op follow up.  · If you have steri strips they should fall off in 7-10 days.    · If you have band aids covering your incisions change daily or when soiled.  The band aids may be removed post op day 1 if they are clean and dry.  · Take your medication exactly as instructed by your doctor.  · Return to your diet as you feel able. Eat a healthy, well-balanced diet.  · Avoid constipation.  ¨ Use laxatives, stool softeners, or enemas as directed  by your doctor.  ¨ Eat more high-fiber foods.  ¨ Drink 6-8 glasses of water every day, unless directed otherwise.  Follow-Up  Make a follow-up appointment as directed by our staff.       When to Call Your Doctor  Call your doctor right away if you have any of the following:  · Fever above 101.5°F  (38.6°C) or chills  · Bright red vaginal bleeding or a foul-smelling discharge  · Vaginal bleeding that soaks more than one sanitary pad per hour  · Trouble urinating or burning sensation when you urinate  · Severe abdominal pain or bloating  · Redness, swelling, or drainage at your incision site  · Shortness of breath           FOLLOW ANY OTHER INSTRUCTIONS GIVEN TO YOU BY DR YA

## 2020-10-12 NOTE — ANESTHESIA POSTPROCEDURE EVALUATION
Anesthesia Post Evaluation    Patient: Tanesha Burns    Procedure(s) Performed: Procedure(s) (LRB):  XI ROBOTIC SACROCOLPOPEXY, ABDOMINAL (Bilateral)  SLING, MIDURETHRAL- single incision ok (N/A)  CYSTOSCOPY (N/A)    Final Anesthesia Type: general    Patient location during evaluation: PACU  Patient participation: Yes- Able to Participate  Level of consciousness: awake and alert  Post-procedure vital signs: reviewed and stable  Pain management: adequate  Airway patency: patent    PONV status at discharge: No PONV  Anesthetic complications: no      Cardiovascular status: blood pressure returned to baseline  Respiratory status: unassisted, spontaneous ventilation and room air  Hydration status: euvolemic  Follow-up not needed.          Vitals Value Taken Time   /59 10/12/20 1530   Temp 36.7 °C (98.1 °F) 10/12/20 1330   Pulse 63 10/12/20 1530   Resp 18 10/12/20 1544   SpO2 98 % 10/12/20 1530         Event Time   Out of Recovery 13:25:40         Pain/Lanny Score: Pain Rating Prior to Med Admin: 5 (10/12/2020  3:44 PM)  Pain Rating Post Med Admin: 3 (10/12/2020  1:00 PM)  Lanny Score: 10 (10/12/2020  3:30 PM)

## 2020-10-12 NOTE — ANESTHESIA PROCEDURE NOTES
Intubation  Performed by: Della Matos CRNA  Authorized by: Emiliana Shaw MD     Intubation:     Induction:  Intravenous    Intubated:  Postinduction    Mask Ventilation:  Easy mask    Attempts:  1    Attempted By:  CRNA    Method of Intubation:  Video laryngoscopy    Blade:  Cordon 3    Laryngeal View Grade: Grade I - full view of chords      Difficult Airway Encountered?: No      Complications:  None    Airway Device:  Oral endotracheal tube    Airway Device Size:  7.5    Style/Cuff Inflation:  Cuffed    Inflation Amount (mL):  5    Tube secured:  21    Secured at:  The lips    Placement Verified By:  Capnometry    Complicating Factors:  None        
no diarrhea/no nausea/no vomiting

## 2020-10-12 NOTE — OPERATIVE NOTE ADDENDUM
Certification of Assistant at Surgery       Surgery Date: 10/12/2020     Participating Surgeons:  Surgeon(s) and Role:     * Vivi Hernández DO - Primary    Procedures:  Procedure(s) (LRB):  XI ROBOTIC SACROCOLPOPEXY, ABDOMINAL (Bilateral)  SLING, MIDURETHRAL- single incision ok (N/A)  CYSTOSCOPY (N/A)    Assistant Surgeon's Certification of Necessity:  I understand that section 1842 (b) (6) (d) of the Social Security Act generally prohibits Medicare Part B reasonable charge payment for the services of assistants at surgery in teaching hospitals when qualified residents are available to furnish such services. I certify that the services for which payment is claimed were medically necessary, and that no qualified resident was available to perform the services. I further understand that these services are subject to post-payment review by the Medicare carrier.      Everett Putnam PA-C    10/12/2020  11:38 AM

## 2020-10-12 NOTE — OR NURSING
Patient was unable to pass voiding trial.  16F Thurman inserted using sterile technique.  275ml clear yellow urine returned.  Patient tolerated procedure well.  Dr Hernández notified.

## 2020-10-12 NOTE — TELEPHONE ENCOUNTER
Post op doing well, unsure if she wants to stay vs go home will give additional time to decide discussed and surgery reviewed with her significant other.

## 2020-10-12 NOTE — PLAN OF CARE
Tanesha Burns has met all discharge criteria from Phase II. Vital Signs are stable, ambulating  without difficulty.Pain is now under control and tolerable for the pt. Pain score 2 at this time.  Discharge instructions given, patient verbalized understanding. Discharged from facility via wheelchair in stable condition.

## 2020-10-12 NOTE — OR NURSING
Vaginal packing removed intact.  300ml NS instilled into bladder and norris removed.  Patient tolerated well.  Patient has 30 minutes to void 200ml.

## 2020-10-12 NOTE — OP NOTE
THIS IS A CONFIDENTIAL AND PRIVILEGED COMMUNICATION PLEASE  DISPOSE OF PAPER COPIES APPROPRIATELY  [facility] [place], [place]  OPERATIVE REPORT        PATIENT NAME: Tanesha Burns     MEDICAL RECORD NUMBER: [id number]    PROCEDURE DATE: 10/12/2020    PROCEDURE:    1. Robotic Sacrocolpopexy  with permanent mesh Restorelle Y Contour (Ref # [id number] Batch code# [id number])  Modifier 22   2. Single incision Mid urethral sling Altis  (Ref # [id number] Batch code# [id number])  3. Cystouretrhroscopy  4.Lysis of adhesions     ATTENDING SURGEON: Vivi Hernández DO    ASSISTANT: Riley PGY 3    PREOPERATIVE DIAGNOSIS:  1. Stage 2 apical prolapse, Stage 3 anterior vaginal wall prolapse    POSTOPERATIVE DIAGNOSIS: same    ANESTHESIA: GET    WOUND CLASSIFICATION: Clean-Contaminated    ANTIBIOTIC PROPHYLAXIS: Gent/Clinda     FINDINGS: Right ovary with filmy adhesions  to the sigmoid colon.  Bladder densely adhered to the vaginal cuff.  Normal cystoscopy with an area of erythema at the base of the bladder.     SPECIMENS SENT:  1. Adhesion     COMPLICATIONS: None     DRAINS: Thurman (350 mL)    ESTIMATED BLOOD LOSS: 25 mL    IV FLUIDS: 1600 mL and 500 mL Albumen     POSTOP CONDITION: stable    PROCEDURE:    The patient was identified in the preoperative area where informed consent was confirmed, and she was taken to the operating room where an adequate level of general anesthesia was obtained.  The patient was positioned in lithotomy position with legs in Yellow fin stirups. Care was taken to avoid joint hyperflexion or hyperextension, and all extremity surfaces were carefully padded so as to minimize risk of neurologic injury. Intravenous antibiotics were administered preoperatively. Sequential compression devices were applied to the patient's lower extremities preoperatively for VTE prophylaxis.  Surgical time-out was performed, where the patient was identified and procedures confirmed.  An examination under  anesthesia was performed with findings described as above.  The patient's abdomen, perineum, and vagina were sterilely prepped and draped. A norris catheter was placed in the bladder for drainage.     Exam under anesthesia revealed stage 2 apical prolapse and stage 3 anterior abdominal wall prolapse.  Attention was then turned to the abdomen. A 8-mm incision infraumbilical incision was made with a scalpel. A Veress needle was then introduced into the abdomen with appropriate placement noted by low opening pressure. The abdomen was then insufflated with CO2 gas to a pressure of 20 mmHg. The Veress needle was removed and the 11-mm trocar was introduced into the abdomen through the incision with the laparoscope to follow. The findings were noted as above.    The camera was inserted through this port for visualization of all subsequent port placement. Two 8 mm ports were placed bilaterally to the supraumbilical port, each approximately 10 cm lateral, along the mid clavicular line approximately 3 cm below the level of the umbilicus. Each 8 mm trocar was inserted under direct visualization without significant trauma, at least 2 cm cephalad and medial of the anterior superior iliac spine. An 8 mm port was placed in the right upper quadrant, cephalad to and midway between the umbilical and 8 mm right lower quadrant port in a triangulated fashion. A third 8 mm robotic port was placed in the left upper quadrant, cephalad to and midway between the umbilical and 8 mm left lower quadrant port in a triangulated fashion. There were no complications with these port placements. At this point, a total of 5 ports had been placed, including the umbilical port for the camera. The robot was then docked. Attention was then turned to  Pelvis.     The course of the right ureter was identified, which was well away from the surgical site. Sharp dissection without cautery was used to lyse the adhesions and free the adnexa. The vesicouterine  peritoneum was then cut using the monopolar scissors to create a bladder flap and the bladder was gently dissected off the anterior aspect of the vagina. approximately one hour and 20 minutes was need to dissect the bladder off the vagina. The bladder was densely adhered to the vagina. We used various methods to help see the plans of the vagina and the bladder to avoid injury. The course of the left ureter was identified, and the left mesosalpinx grasped, coagulated, and cut in several successive steps followed by further development of the bladder flap with the monopolar scissor. The bladder was gently dissected off the anterior aspect of the vagina on the left side. Once both tubes were free bilateral tubes were sent to pathology.       An incision was made in the peritoneum posterior aspect of the cuff in the midline and the peritoneum was dissected laterally creating peritoneal flaps to cover the sacrocolpopexy mesh. Attention was then turned to the sacral promontory, which was identified by visualization and palpation. Again, the course of the right ureter as well as the location of the sigmoid colon was identified. The peritoneum overlying the sacral promontory was then elevated and incised using the laparoscopic scissors. Gentle blunt dissection was then undertaken to reveal presacral fascia again with hemostasis noted. A peritoneal tunnel was then made from the sacral promontory along the right pelvic sidewall and right uterosacral ligament, up to the incision that had been previously made on the posterior aspect of the cuff. The mesh was then trimmed and introduced into the abdomen.     The anterior and posterior arms of the mesh were attached to the anterior and posterior aspects of the vagina with 0 Vicryl. The mesh was further secured to the cuff with 0   PDS sutures, 3 placed anteriorly and 3 posteriorly at the apex of the vagina.  The long arm of the mesh was then brought through the peritoneal tunnel  that had been created, and the mesh was tensioned appropriately and attached to the sacral promontory with 2 sutures of 0 Prolene.  We placed surgiflow into the peritoneal tunnel to additional hemostasis. Excess mesh was again trimmed. The peritoneum overlying the sacral promontory was reapproximated with 2. 0 Vicryl in a pursestring fashion. The anterior and posterior leafs of the peritoneum in the pelvis were reapproximated using 2.0 Vicryl in a pursestring fashion as well, essentially covering the entire mesh. Attention was then turned to the removing the adhesion to pathology.  The abdomen and pelvis was again evaluated and noted to have excellent hemostatis. We placed a sheet of interceed covering the peritoneal incision.      Attention was turned to cystoscopy, which revealed bilateral ureteral catheters in place. Each catheter was removed under direct visualization and bilateral jets were noted; bladder mucosa was  intact, no injuries, no stones or diverticulum.  The Thurman catheter was reinserted. Again, close inspection of the pelvis revealed excellent hemostasis. All instruments were then removed from the abdomen and the pelvis, and the robot undocked. The remainder of the skin incisions were reapproximated with 2.0 Monocryl , Steri-strips, 2x2s, and Tegaderm.                             The single incision sling was then placed.  Allis clamp placed 1 cm proximal to the urethra along the midline of the anterior vaginal wall. A second Allis clamp was placed 1 cm proximal to the vaginal apex. A vertical incision was made in the midline of the anterior vaginal wall between the 2 Allis clamps with the scalpel. Using the metzenbaum scissors, tracts were made lateral to the urethra towards the ischiopubic ramus bilaterally.  The  Coloplasty Altis sling device was then assembled.  The first trocar was inserted lateral to the urethra on the right side. The trocar was passed behind the pubic ramous towards the  behind the bone and into the obturator membrane bilaterally to allow for placement of the sling.  In a similar manner, the second trocar was introduced on the left side and passed.  The catheter was removed and cystocospy was undertaken which showed normal bladder mucosa without foreign objects, bilateral ureteral jets and normal urethra on antegrade urethroscopy. The norris was rplaced. The sling was appropriately placed without tension, the blue  Suture was tightened until no leakage of urine was noted with Crede. The vaginal epithelium was re-aproximated with 2.0 Vicryl in a running fashion.    The patient tolerated the procedure well. Needle, sponge lap, and instrument counts were correct x2. The patient was transferred to recovery in stable condition.    I was present and participated in the entire procedure.        DICTATED BY Vivi Hernández, DO  _________________________

## 2020-10-12 NOTE — INTERVAL H&P NOTE
The patient has been examined and the H&P has been reviewed:    I concur with the findings and no changes have occurred since H&P was written.    Surgery risks, benefits and alternative options discussed and understood by patient/family.          Active Hospital Problems    Diagnosis  POA    Vaginal vault prolapse [N81.9]  Yes      Resolved Hospital Problems   No resolved problems to display.

## 2020-10-12 NOTE — TRANSFER OF CARE
"Anesthesia Transfer of Care Note    Patient: Tanesha Burns    Procedure(s) Performed: Procedure(s) (LRB):  XI ROBOTIC SACROCOLPOPEXY, ABDOMINAL (Bilateral)  SLING, MIDURETHRAL- single incision ok (N/A)  CYSTOSCOPY (N/A)    Patient location: PACU    Anesthesia Type: general    Transport from OR: Transported from OR on 2-3 L/min O2 by NC with adequate spontaneous ventilation    Post pain: adequate analgesia    Post assessment: no apparent anesthetic complications    Post vital signs: stable    Level of consciousness: awake, alert and oriented    Nausea/Vomiting: no nausea/vomiting    Complications: none    Transfer of care protocol was followed      Last vitals:   Visit Vitals  /65 (BP Location: Right arm, Patient Position: Sitting)   Pulse 87   Temp 36.3 °C (97.3 °F) (Skin)   Resp 18   Ht 5' 3" (1.6 m)   Wt 80.7 kg (177 lb 14.6 oz)   LMP 03/29/2016   SpO2 99%   Breastfeeding No   BMI 31.52 kg/m²     "

## 2020-10-14 ENCOUNTER — PROCEDURE VISIT (OUTPATIENT)
Dept: UROGYNECOLOGY | Facility: CLINIC | Age: 55
End: 2020-10-14
Payer: COMMERCIAL

## 2020-10-14 VITALS
DIASTOLIC BLOOD PRESSURE: 64 MMHG | SYSTOLIC BLOOD PRESSURE: 140 MMHG | HEIGHT: 63 IN | BODY MASS INDEX: 32.66 KG/M2 | HEART RATE: 70 BPM | WEIGHT: 184.31 LBS

## 2020-10-14 DIAGNOSIS — Z97.8 FOLEY CATHETER IN PLACE: ICD-10-CM

## 2020-10-14 DIAGNOSIS — Z98.890 POST-OPERATIVE STATE: Primary | ICD-10-CM

## 2020-10-14 LAB
FINAL PATHOLOGIC DIAGNOSIS: NORMAL
GROSS: NORMAL
Lab: NORMAL

## 2020-10-14 PROCEDURE — 87086 URINE CULTURE/COLONY COUNT: CPT

## 2020-10-14 NOTE — PROCEDURES
Procedures     Voiding Trial: First disconnected catheter from the norris. The catheter tip syringe was placed and filled with 300 mL of sterile water. When the norris catheter was removed, the patient was allowed to void in the bathroom and the urine was collected in a hat and measured. She voided 250 mLs. She has passed her void trial.     Everett Putnam PA-C  Female Pelvic Medicine & Reconstructive Surgery

## 2020-10-14 NOTE — PROGRESS NOTES
Patient is doing well. She passed her void trial today. She has not had a BM yet, is passing flatus. Stopped taking oxycodone yesterday and started colace yesterday. Told her to continue colace twice daily and if no BM by Thursday, to take milk of magnesia. If still no BM, please call us.

## 2020-10-16 ENCOUNTER — TELEPHONE (OUTPATIENT)
Dept: UROGYNECOLOGY | Facility: OTHER | Age: 55
End: 2020-10-16

## 2020-10-16 LAB — BACTERIA UR CULT: NO GROWTH

## 2020-10-16 NOTE — TELEPHONE ENCOUNTER
Spoke to patient, she is feeling much better and has had a BM. Reminded her to take it slow, even though she is feeling better she needs to make sure that she does not over do it.

## 2020-10-16 NOTE — DISCHARGE SUMMARY
[facility]  Obstetrics & Gynecology  Discharge Summary    Patient Name: Tanesha Burns  MRN: [id number]  Admission Date: 10/12/2020  Hospital Length of Stay: 0 days  Discharge Date and Time: 10/12/2020  4:40 PM  Attending Physician: No att. providers found   Discharging Provider: Vivi Hernández DO  Primary Care Provider: Pedro Carrera DO    HPI:     HPI 55 Y O F  has a past medical history of Abnormal Pap smear (1983), Colon polyps, Encounter for blood transfusion, History of abnormal Pap smear, Hypothyroid, and Psoriasis.     Interval  HP since the last visit   1)  UI:  (+) LIZ  (+) UUI  (--) pads: changes undergarment once a week. Daytime frequency: Q 4 -5 hours.  Nocturia: No:    (-) dysuria-  (--) hematuria,  (--) frequent UTIs.  (+) complete bladder emptying.      2)  POP: Symptoms:(--)  .  (--) vaginal bleeding. (--) vaginal discharge. (+) sexually active.  (--) dyspareunia.   (--)  Vaginal dryness.  (--) vaginal estrogen use..     3)  BM:  (+) constipation/straining.  (--) chronic diarrhea. (--) hematochezia.  (--) fecal incontinence.  (--) fecal smearing/urgency.  (--) incomplete evacuation.       4) Sinus infection- has has this frequently in the plast, currently has bothersome green mucous production and sinus tenderness, Requsting a Z-Pac .      Will be out of insurance on November 30th. Looking to have something done prior.      2016:   Uterosacral ligament suspension, Cystourethroscopy, Posterior Colporrhaphy, Perinealplasty       Suds: + LIZ      Hospital Course: s/p robotic SCP with permnianant mesh and mid-urethral sling doing well.     Procedure(s) (LRB):  XI ROBOTIC SACROCOLPOPEXY, ABDOMINAL (Bilateral)  SLING, MIDURETHRAL- single incision ok (N/A)  CYSTOSCOPY (N/A)     Consults:   None     Significant Diagnostic Studies:     Pending Diagnostic Studies:     None        Final Active Diagnoses:    Diagnosis Date Noted POA    PRINCIPAL PROBLEM:  Vaginal vault  prolapse [N81.9] 10/12/2020 Yes    Stress incontinence, female [N39.3] 02/11/2016 Yes      Problems Resolved During this Admission:        Discharged Condition: good    Disposition: Home or Self Care    Follow Up:  Follow-up Information     Viiv Hernández DO.    Specialties: Gynecology, Urology  Why: OFFICE WILL CALL FOR BLEVINS REMOVAL  Contact information:  [address]  [facility] [address]  [phone]                 Patient Instructions:      Call MD for:  temperature >100.4     Call MD for:  persistent nausea and vomiting or diarrhea     Call MD for:  severe uncontrolled pain     Call MD for:  redness, tenderness, or signs of infection (pain, swelling, redness, odor or green/yellow discharge around incision site)     Call MD for:  difficulty breathing or increased cough     Call MD for:  severe persistent headache     Call MD for:  worsening rash     Call MD for:  persistent dizziness, light-headedness, or visual disturbances     Call MD for:  increased confusion or weakness     Call MD for:     Medications:  Reconciled Home Medications:      Medication List      ASK your doctor about these medications    levothyroxine 50 MCG tablet  Commonly known as: SYNTHROID  Take 1 tablet (50 mcg total) by mouth once daily.            Vivi Hernández DO  Obstetrics & Gynecology  [facility]

## 2020-10-21 ENCOUNTER — OFFICE VISIT (OUTPATIENT)
Dept: UROGYNECOLOGY | Facility: CLINIC | Age: 55
End: 2020-10-21
Payer: COMMERCIAL

## 2020-10-21 VITALS
HEIGHT: 63 IN | HEART RATE: 76 BPM | DIASTOLIC BLOOD PRESSURE: 59 MMHG | SYSTOLIC BLOOD PRESSURE: 112 MMHG | BODY MASS INDEX: 31.41 KG/M2 | WEIGHT: 177.25 LBS

## 2020-10-21 DIAGNOSIS — N39.3 STRESS INCONTINENCE, FEMALE: ICD-10-CM

## 2020-10-21 DIAGNOSIS — N99.3 VAGINAL VAULT PROLAPSE, POSTHYSTERECTOMY: ICD-10-CM

## 2020-10-21 DIAGNOSIS — Z98.890 POST-OPERATIVE STATE: Primary | ICD-10-CM

## 2020-10-21 PROCEDURE — 99999 PR PBB SHADOW E&M-EST. PATIENT-LVL III: ICD-10-PCS | Mod: PBBFAC,,, | Performed by: PHYSICIAN ASSISTANT

## 2020-10-21 PROCEDURE — 99999 PR PBB SHADOW E&M-EST. PATIENT-LVL III: CPT | Mod: PBBFAC,,, | Performed by: PHYSICIAN ASSISTANT

## 2020-10-21 PROCEDURE — 99024 POSTOP FOLLOW-UP VISIT: CPT | Mod: S$GLB,,, | Performed by: PHYSICIAN ASSISTANT

## 2020-10-21 PROCEDURE — 99024 PR POST-OP FOLLOW-UP VISIT: ICD-10-PCS | Mod: S$GLB,,, | Performed by: PHYSICIAN ASSISTANT

## 2020-10-21 NOTE — PROGRESS NOTES
"  [facility] UROGYNECOLOGY-[id number]   [address] [address]  October 21, 2020     Tanesha Burns  [id number]  1965    UROGYN POST-OP  10/21/2020     Tanesha Burns is a 55 y.o. here for a 2-week post operative visit.    OPERATIVE NOTE  10/12/2020  PROCEDURE:    1. Robotic Sacrocolpopexy  with permanent mesh Restorelle Y Contour (Ref # [id number] Batch code# [id number])  Modifier 22   2. Single incision Mid urethral sling Altis  (Ref # [id number] Batch code# [id number])  3. Cystouretrhroscopy  4.Lysis of adhesions      ATTENDING SURGEON: Vivi Hernández DO     ASSISTANT: Riley PGY 3     PREOPERATIVE DIAGNOSIS:  1. Stage 2 apical prolapse, Stage 3 anterior vaginal wall prolapse     POSTOPERATIVE DIAGNOSIS: same    HISTORY SINCE LAST VISIT:  No pain, minimal bleeding, has not seen any sutures  Bladder issues: "Great!" No leakage, decreased urgency, no LIZ. Feels like she is emptying well.   Bowel issues: Regular, taking taking Colcace twice daily    Past Medical History:   Diagnosis Date    Abnormal Pap smear 1983    cryo    Colon polyps     Encounter for blood transfusion     History of abnormal Pap smear     s/p cryo at age 16    Hypothyroid     Psoriasis        Past Surgical History:   Procedure Laterality Date    BUNIONECTOMY      bilat    COLONOSCOPY N/A 12/7/2018    Procedure: COLONOSCOPY;  Surgeon: Evelio García MD;  Location: [address]);  Service: Endoscopy;  Laterality: N/A;  Requests Dec 14, 2018    CRYOTHERAPY      FOREARM FRACTURE SURGERY      HYSTERECTOMY      INSERTION OF MIDURETHRAL SLING N/A 10/12/2020    Procedure: SLING, MIDURETHRAL- single incision ok;  Surgeon: Vivi Hernández DO;  Location: [facility];  Service: OB/GYN;  Laterality: N/A;    RETINAL LASER PROCEDURE      ROBOT-ASSISTED LAPAROSCOPIC ABDOMINAL SACROCOLPOPEXY USING DA WM XI Bilateral 10/12/2020    Procedure: XI ROBOTIC SACROCOLPOPEXY, ABDOMINAL;  Surgeon: Vivi" Edgar, ;  Location: [facility];  Service: OB/GYN;  Laterality: Bilateral;    TEMPOROMANDIBULAR JOINT SURGERY      TUBAL LIGATION  2/09    WRIST SURGERY Left        Family History   Problem Relation Age of Onset    Coronary artery disease Father 60    Prostate cancer Father     Colon cancer Father     Liver cancer Father     Hypertension Father     Cancer Father 65        colon    Diabetes Maternal Grandfather     Breast cancer Maternal Grandmother 90        GREAT grandmother    Ovarian cancer Neg Hx     Stroke Neg Hx     Cervical cancer Neg Hx     Endometrial cancer Neg Hx     Vaginal cancer Neg Hx        Social History     Socioeconomic History    Marital status:      Spouse name: Not on file    Number of children: 2    Years of education: Not on file    Highest education level: Not on file   Occupational History    Occupation:     Social Needs    Financial resource strain: Not on file    Food insecurity     Worry: Not on file     Inability: Not on file    Transportation needs     Medical: Not on file     Non-medical: Not on file   Tobacco Use    Smoking status: Never Smoker    Smokeless tobacco: Never Used   Substance and Sexual Activity    Alcohol use: No     Comment: rare    Drug use: No    Sexual activity: Yes     Partners: Male   Lifestyle    Physical activity     Days per week: Not on file     Minutes per session: Not on file    Stress: Not on file   Relationships    Social connections     Talks on phone: Not on file     Gets together: Not on file     Attends Protestant service: Not on file     Active member of club or organization: Not on file     Attends meetings of clubs or organizations: Not on file     Relationship status: Not on file   Other Topics Concern    Not on file   Social History Narrative    Not on file       Current Outpatient Medications   Medication Sig Dispense Refill    levothyroxine (SYNTHROID) 50 MCG tablet Take 1 tablet (50  mcg total) by mouth once daily. 90 tablet 3     No current facility-administered medications for this visit.        Review of patient's allergies indicates:  No Known Allergies     ROS  Per HPI    Physical Exam  LMP 03/29/2016   General: alert and oriented, no acute distress  Respiratory: normal respiratory effort  Abd: soft, non-tender, non-distended, removed steri-strips without difficulty, 5 lap incisions healing well  Pelvic:  Ext. Genitalia: normal external genitalia. Normal bartholin's and skeens glands  Vagina: + atrophy. No discharge noted. 5mm subcutaneous bruising at vaginal cuff, both midline and L apex, non-tender, somewhat friable.  Non-tender bladder base without palpable mass.  Cervix: absent  Uterus:  surgically absent, vaginal cuff well healed   Urethra: no masses or tenderness  Urethral meatus: no lesions, caruncle or prolapse.    Impression  No diagnosis found.  We reviewed the above issues and discussed options for short-term versus long-term management of her problems.     Plan:   1. Post op healing well. Continue to follow post op instructions.  2. Continue taking colace twice daily.   3. Patient will follow up with us in 4 weeks as scheduled.     Dr. Hernández present for exam.     20 minutes were spent in face to face time with this patient  50 % of this time was spent in counseling and/or coordination of care    Everett Putnam PA-C  [facility]  Division of Female Pelvic Medicine and Reconstructive Surgery  Department of Obstetrics & Gynecology

## 2020-11-18 ENCOUNTER — OFFICE VISIT (OUTPATIENT)
Dept: UROGYNECOLOGY | Facility: CLINIC | Age: 55
End: 2020-11-18
Payer: COMMERCIAL

## 2020-11-18 VITALS
HEART RATE: 68 BPM | HEIGHT: 63 IN | BODY MASS INDEX: 31.02 KG/M2 | WEIGHT: 175.06 LBS | SYSTOLIC BLOOD PRESSURE: 126 MMHG | DIASTOLIC BLOOD PRESSURE: 66 MMHG

## 2020-11-18 DIAGNOSIS — Z98.890 POST-OPERATIVE STATE: Primary | ICD-10-CM

## 2020-11-18 PROCEDURE — 3008F PR BODY MASS INDEX (BMI) DOCUMENTED: ICD-10-PCS | Mod: CPTII,S$GLB,, | Performed by: PHYSICIAN ASSISTANT

## 2020-11-18 PROCEDURE — 99999 PR PBB SHADOW E&M-EST. PATIENT-LVL III: ICD-10-PCS | Mod: PBBFAC,,, | Performed by: PHYSICIAN ASSISTANT

## 2020-11-18 PROCEDURE — 99999 PR PBB SHADOW E&M-EST. PATIENT-LVL III: CPT | Mod: PBBFAC,,, | Performed by: PHYSICIAN ASSISTANT

## 2020-11-18 PROCEDURE — 99024 POSTOP FOLLOW-UP VISIT: CPT | Mod: S$GLB,,, | Performed by: PHYSICIAN ASSISTANT

## 2020-11-18 PROCEDURE — 99024 PR POST-OP FOLLOW-UP VISIT: ICD-10-PCS | Mod: S$GLB,,, | Performed by: PHYSICIAN ASSISTANT

## 2020-11-18 PROCEDURE — 1126F PR PAIN SEVERITY QUANTIFIED, NO PAIN PRESENT: ICD-10-PCS | Mod: S$GLB,,, | Performed by: PHYSICIAN ASSISTANT

## 2020-11-18 PROCEDURE — 1126F AMNT PAIN NOTED NONE PRSNT: CPT | Mod: S$GLB,,, | Performed by: PHYSICIAN ASSISTANT

## 2020-11-18 PROCEDURE — 3008F BODY MASS INDEX DOCD: CPT | Mod: CPTII,S$GLB,, | Performed by: PHYSICIAN ASSISTANT

## 2020-11-18 NOTE — PROGRESS NOTES
"[facility] UROGYNECOLOGY-[id number]   [address] [address]  November 18, 2020     Tanesha Burns  [id number]  1965    UROGYN POST-OP  11/18/2020     Tanesha Burns is a 55 y.o.  here for a 6 week post operative visit.    OPERATIVE NOTE  10/12/2020  PROCEDURE:    1. Robotic Sacrocolpopexy  with permanent mesh Restorelle Y Contour (Ref # [id number] Batch code# [id number])  Modifier 22   2. Single incision Mid urethral sling Altis  (Ref # [id number] Batch code# [id number])  3. Cystouretrhroscopy  4.Lysis of adhesions      ATTENDING SURGEON: Vivi Hernández DO     ASSISTANT: Riley LAURENTY 3     PREOPERATIVE DIAGNOSIS:  1. Stage 2 apical prolapse, Stage 3 anterior vaginal wall prolapse     POSTOPERATIVE DIAGNOSIS: same     10/21/2020:  No pain, minimal bleeding, has not seen any sutures  Bladder issues: "Great!" No leakage, decreased urgency, no LIZ. Feels like she is emptying well.   Bowel issues: Regular, taking taking Colcace twice daily    HISTORY SINCE LAST VISIT:  Patient has no complaints. Doing well.     History taken by Dr. Hernández.     Past Medical History:   Diagnosis Date    Abnormal Pap smear 1983    cryo    Colon polyps     Encounter for blood transfusion     History of abnormal Pap smear     s/p cryo at age 16    Hypothyroid     Psoriasis        Past Surgical History:   Procedure Laterality Date    BUNIONECTOMY      bilat    COLONOSCOPY N/A 12/7/2018    Procedure: COLONOSCOPY;  Surgeon: Evelio García MD;  Location: [facility] ([address] [place] [address]);  Service: Endoscopy;  Laterality: N/A;  Requests Dec 14, 2018    CRYOTHERAPY      FOREARM FRACTURE SURGERY      HYSTERECTOMY      INSERTION OF MIDURETHRAL SLING N/A 10/12/2020    Procedure: SLING, MIDURETHRAL- single incision ok;  Surgeon: Vivi Hernández DO;  Location: [facility] OR;  Service: OB/GYN;  Laterality: N/A;    RETINAL LASER PROCEDURE      ROBOT-ASSISTED LAPAROSCOPIC ABDOMINAL SACROCOLPOPEXY USING DA WM XI " Bilateral 10/12/2020    Procedure: XI ROBOTIC SACROCOLPOPEXY, ABDOMINAL;  Surgeon: Vivi Hernández DO;  Location: [facility];  Service: OB/GYN;  Laterality: Bilateral;    TEMPOROMANDIBULAR JOINT SURGERY      TUBAL LIGATION  2/09    WRIST SURGERY Left        Family History   Problem Relation Age of Onset    Coronary artery disease Father 60    Prostate cancer Father     Colon cancer Father     Liver cancer Father     Hypertension Father     Cancer Father 65        colon    Diabetes Maternal Grandfather     Breast cancer Maternal Grandmother 90        GREAT grandmother    Ovarian cancer Neg Hx     Stroke Neg Hx     Cervical cancer Neg Hx     Endometrial cancer Neg Hx     Vaginal cancer Neg Hx        Social History     Socioeconomic History    Marital status:      Spouse name: Not on file    Number of children: 2    Years of education: Not on file    Highest education level: Not on file   Occupational History    Occupation:     Social Needs    Financial resource strain: Not on file    Food insecurity     Worry: Not on file     Inability: Not on file    Transportation needs     Medical: Not on file     Non-medical: Not on file   Tobacco Use    Smoking status: Never Smoker    Smokeless tobacco: Never Used   Substance and Sexual Activity    Alcohol use: No     Comment: rare    Drug use: No    Sexual activity: Yes     Partners: Male   Lifestyle    Physical activity     Days per week: Not on file     Minutes per session: Not on file    Stress: Not on file   Relationships    Social connections     Talks on phone: Not on file     Gets together: Not on file     Attends Sikh service: Not on file     Active member of club or organization: Not on file     Attends meetings of clubs or organizations: Not on file     Relationship status: Not on file   Other Topics Concern    Not on file   Social History Narrative    Not on file       Current Outpatient Medications    Medication Sig Dispense Refill    levothyroxine (SYNTHROID) 50 MCG tablet Take 1 tablet (50 mcg total) by mouth once daily. 90 tablet 3     No current facility-administered medications for this visit.        Review of patient's allergies indicates:  No Known Allergies     ROS  Per HPI    Physical Exam  LMP 03/29/2016   General: alert and oriented, no acute distress  Respiratory: normal respiratory effort  Abd: soft, non-tender, non-distended  Pelvic:  Ext. Genitalia: normal external genitalia. Normal bartholin's and skeens glands  Vagina: - atrophy. Normal vaginal mucosa without lesions. No discharge noted.   Non-tender bladder base without palpable mass. Sutures present. No signs of infection.   Cervix: absent  Uterus:  surgically absent, vaginal cuff well healed   Urethra: no masses or tenderness  Urethral meatus: no lesions, caruncle or prolapse.      Impression  No diagnosis found.  We reviewed the above issues and discussed options for short-term versus long-term management of her problems.     Plan:   1. Post op healing well. Continue to follow post op instructions.  2. No intercourse for 1 more week.  3. Patient will follow up with us in 3 months    15 minutes were spent in face to face time with this patient  80 % of this time was spent in counseling and/or coordination of care    Dr. Coyne present for entire visit.     Everett Putnam PA-C  [facility]  Division of Female Pelvic Medicine and Reconstructive Surgery  Department of Obstetrics & Gynecology

## 2021-01-07 ENCOUNTER — PATIENT MESSAGE (OUTPATIENT)
Dept: UROGYNECOLOGY | Facility: CLINIC | Age: 56
End: 2021-01-07

## 2021-01-07 DIAGNOSIS — N39.3 STRESS INCONTINENCE, FEMALE: Primary | ICD-10-CM

## 2021-01-08 ENCOUNTER — LAB VISIT (OUTPATIENT)
Dept: LAB | Facility: HOSPITAL | Age: 56
End: 2021-01-08
Attending: OBSTETRICS & GYNECOLOGY
Payer: COMMERCIAL

## 2021-01-08 DIAGNOSIS — N39.3 STRESS INCONTINENCE, FEMALE: ICD-10-CM

## 2021-01-08 LAB
BACTERIA #/AREA URNS AUTO: ABNORMAL /HPF
BILIRUB UR QL STRIP: NEGATIVE
CLARITY UR REFRACT.AUTO: ABNORMAL
COLOR UR AUTO: ABNORMAL
GLUCOSE UR QL STRIP: NEGATIVE
HGB UR QL STRIP: ABNORMAL
KETONES UR QL STRIP: NEGATIVE
LEUKOCYTE ESTERASE UR QL STRIP: ABNORMAL
MICROSCOPIC COMMENT: ABNORMAL
NITRITE UR QL STRIP: NEGATIVE
PH UR STRIP: 7 [PH] (ref 5–8)
PROT UR QL STRIP: NEGATIVE
RBC #/AREA URNS AUTO: 1 /HPF (ref 0–4)
SP GR UR STRIP: 1 (ref 1–1.03)
SQUAMOUS #/AREA URNS AUTO: 5 /HPF
URN SPEC COLLECT METH UR: ABNORMAL
WBC #/AREA URNS AUTO: 34 /HPF (ref 0–5)

## 2021-01-08 PROCEDURE — 87086 URINE CULTURE/COLONY COUNT: CPT

## 2021-01-08 PROCEDURE — 87186 SC STD MICRODIL/AGAR DIL: CPT

## 2021-01-08 PROCEDURE — 87088 URINE BACTERIA CULTURE: CPT

## 2021-01-08 PROCEDURE — 87077 CULTURE AEROBIC IDENTIFY: CPT

## 2021-01-08 PROCEDURE — 81001 URINALYSIS AUTO W/SCOPE: CPT

## 2021-01-11 ENCOUNTER — PATIENT MESSAGE (OUTPATIENT)
Dept: UROGYNECOLOGY | Facility: CLINIC | Age: 56
End: 2021-01-11

## 2021-01-11 LAB — BACTERIA UR CULT: ABNORMAL

## 2021-04-02 ENCOUNTER — IMMUNIZATION (OUTPATIENT)
Dept: PRIMARY CARE CLINIC | Facility: CLINIC | Age: 56
End: 2021-04-02
Payer: COMMERCIAL

## 2021-04-02 DIAGNOSIS — Z23 NEED FOR VACCINATION: Primary | ICD-10-CM

## 2021-04-02 PROCEDURE — 91300 PR SARS-COV- 2 COVID-19 VACCINE, NO PRSV, 30MCG/0.3ML, IM: ICD-10-PCS | Mod: S$GLB,,, | Performed by: INTERNAL MEDICINE

## 2021-04-02 PROCEDURE — 0001A PR IMMUNIZ ADMIN, SARS-COV-2 COVID-19 VACC, 30MCG/0.3ML, 1ST DOSE: CPT | Mod: CV19,S$GLB,, | Performed by: INTERNAL MEDICINE

## 2021-04-02 PROCEDURE — 91300 PR SARS-COV- 2 COVID-19 VACCINE, NO PRSV, 30MCG/0.3ML, IM: CPT | Mod: S$GLB,,, | Performed by: INTERNAL MEDICINE

## 2021-04-02 PROCEDURE — 0001A PR IMMUNIZ ADMIN, SARS-COV-2 COVID-19 VACC, 30MCG/0.3ML, 1ST DOSE: ICD-10-PCS | Mod: CV19,S$GLB,, | Performed by: INTERNAL MEDICINE

## 2021-04-02 RX ADMIN — Medication 0.3 ML: at 08:04

## 2021-04-23 ENCOUNTER — IMMUNIZATION (OUTPATIENT)
Dept: PRIMARY CARE CLINIC | Facility: CLINIC | Age: 56
End: 2021-04-23
Payer: COMMERCIAL

## 2021-04-23 DIAGNOSIS — Z23 NEED FOR VACCINATION: Primary | ICD-10-CM

## 2021-04-23 PROCEDURE — 0002A PR IMMUNIZ ADMIN, SARS-COV-2 COVID-19 VACC, 30MCG/0.3ML, 2ND DOSE: CPT | Mod: CV19,S$GLB,, | Performed by: INTERNAL MEDICINE

## 2021-04-23 PROCEDURE — 91300 PR SARS-COV- 2 COVID-19 VACCINE, NO PRSV, 30MCG/0.3ML, IM: CPT | Mod: S$GLB,,, | Performed by: INTERNAL MEDICINE

## 2021-04-23 PROCEDURE — 0002A PR IMMUNIZ ADMIN, SARS-COV-2 COVID-19 VACC, 30MCG/0.3ML, 2ND DOSE: ICD-10-PCS | Mod: CV19,S$GLB,, | Performed by: INTERNAL MEDICINE

## 2021-04-23 PROCEDURE — 91300 PR SARS-COV- 2 COVID-19 VACCINE, NO PRSV, 30MCG/0.3ML, IM: ICD-10-PCS | Mod: S$GLB,,, | Performed by: INTERNAL MEDICINE

## 2021-04-23 RX ADMIN — Medication 0.3 ML: at 08:04

## 2021-05-06 ENCOUNTER — PATIENT MESSAGE (OUTPATIENT)
Dept: INTERNAL MEDICINE | Facility: CLINIC | Age: 56
End: 2021-05-06

## 2021-05-06 DIAGNOSIS — E03.4 HYPOTHYROIDISM DUE TO ACQUIRED ATROPHY OF THYROID: ICD-10-CM

## 2021-05-07 RX ORDER — LEVOTHYROXINE SODIUM 50 UG/1
50 TABLET ORAL DAILY
Qty: 90 TABLET | Refills: 0 | Status: SHIPPED | OUTPATIENT
Start: 2021-05-07 | End: 2021-07-30

## 2021-10-04 ENCOUNTER — PATIENT MESSAGE (OUTPATIENT)
Dept: ADMINISTRATIVE | Facility: HOSPITAL | Age: 56
End: 2021-10-04

## 2021-12-31 ENCOUNTER — PATIENT MESSAGE (OUTPATIENT)
Dept: INTERNAL MEDICINE | Facility: CLINIC | Age: 56
End: 2021-12-31
Payer: COMMERCIAL

## 2022-01-03 RX ORDER — AMOXICILLIN AND CLAVULANATE POTASSIUM 875; 125 MG/1; MG/1
1 TABLET, FILM COATED ORAL 2 TIMES DAILY
Qty: 20 TABLET | Refills: 0 | Status: SHIPPED | OUTPATIENT
Start: 2022-01-03 | End: 2022-01-13

## 2022-01-18 ENCOUNTER — PATIENT MESSAGE (OUTPATIENT)
Dept: ADMINISTRATIVE | Facility: HOSPITAL | Age: 57
End: 2022-01-18
Payer: COMMERCIAL

## 2022-01-20 DIAGNOSIS — E03.4 HYPOTHYROIDISM DUE TO ACQUIRED ATROPHY OF THYROID: ICD-10-CM

## 2022-01-20 RX ORDER — LEVOTHYROXINE SODIUM 50 UG/1
TABLET ORAL
Qty: 90 TABLET | Refills: 1 | Status: SHIPPED | OUTPATIENT
Start: 2022-01-20 | End: 2022-08-11 | Stop reason: SDUPTHER

## 2022-01-20 NOTE — TELEPHONE ENCOUNTER
No new care gaps identified.  Powered by [employer]. Reference number: [id number].   1/20/2022 12:04:39 AM CST

## 2022-02-11 ENCOUNTER — PATIENT OUTREACH (OUTPATIENT)
Dept: ADMINISTRATIVE | Facility: HOSPITAL | Age: 57
End: 2022-02-11
Payer: COMMERCIAL

## 2022-02-11 DIAGNOSIS — Z12.39 ENCOUNTER FOR SCREENING FOR MALIGNANT NEOPLASM OF BREAST, UNSPECIFIED SCREENING MODALITY: Primary | ICD-10-CM

## 2022-03-16 ENCOUNTER — PATIENT MESSAGE (OUTPATIENT)
Dept: ADMINISTRATIVE | Facility: HOSPITAL | Age: 57
End: 2022-03-16
Payer: COMMERCIAL

## 2022-08-10 NOTE — PROGRESS NOTES
Subjective:     @Patient ID: Tanesha Burns is a 57 y.o. female.    Chief Complaint: Annual Exam, Medication Refill (syntroid), and Shoulder Pain (Left shoulder)    HPI    57 y.o. female here for annual exam:  Pt's PCP is Dr Carrera     Pt reports she is self pay and would like only a few labs as necessary. She reports having psoriasis. Reports has pain in her hands and L shoulder. Sometimes has hand stiffness. She suspects she may have psoriatic arthritis. Would like inflammatory markers checks     Lipid disorders/ASCVD risk (ages >/= 45 or >/= 20 if increased risk ): ordered  DM (>45y yearly or if obese, HTN): A1c ordered   Eye exam: *   Breast Cancer (40-50y discretion of pt, 50-74y every 1-2 years): Mammogram she reports done at o/s facility     Colorectal Cancer (normal risk 50-75yr): Colonoscopy  utd 2018       Vaccines:   Influenza (yearly)    Tetanus (every 10 yrs - 1st tdap) Due   Zoster (>61yo)   Covid19: due for booster              Review of Systems   Constitutional: Negative for chills and fever.   HENT: Negative for congestion and sore throat.    Eyes: Negative for pain and visual disturbance.   Respiratory: Negative for cough and shortness of breath.    Cardiovascular: Negative for chest pain and leg swelling.   Gastrointestinal: Negative for abdominal pain, nausea and vomiting.   Endocrine: Negative for polydipsia and polyuria.   Genitourinary: Negative for difficulty urinating and dysuria.   Musculoskeletal: Positive for arthralgias. Negative for back pain.   Skin: Negative for rash and wound.   Neurological: Negative for dizziness, weakness and headaches.   Psychiatric/Behavioral: Negative for agitation and confusion.     Past medical history, surgical history, and family medical history reviewed and updated as appropriate.    Medications and allergies reviewed.     Objective:     There were no vitals filed for this visit.  There is no height or weight on file to calculate BMI.  Physical  Exam  Vitals reviewed.   Constitutional:       General: She is not in acute distress.     Appearance: She is well-developed.   HENT:      Head: Normocephalic and atraumatic.      Right Ear: Tympanic membrane normal.      Left Ear: Tympanic membrane normal.      Mouth/Throat:      Mouth: Mucous membranes are moist.      Pharynx: No oropharyngeal exudate.   Eyes:      General:         Right eye: No discharge.         Left eye: No discharge.      Conjunctiva/sclera: Conjunctivae normal.   Cardiovascular:      Rate and Rhythm: Normal rate and regular rhythm.      Heart sounds: No murmur heard.    No friction rub.   Pulmonary:      Effort: Pulmonary effort is normal.      Breath sounds: Normal breath sounds.   Abdominal:      General: Bowel sounds are normal. There is no distension.      Palpations: Abdomen is soft.      Tenderness: There is no abdominal tenderness. There is no guarding.   Musculoskeletal:      Cervical back: Normal range of motion and neck supple.      Right lower leg: No edema.      Left lower leg: No edema.   Lymphadenopathy:      Cervical: No cervical adenopathy.   Skin:     General: Skin is warm and dry.   Neurological:      Mental Status: She is alert and oriented to person, place, and time.   Psychiatric:         Mood and Affect: Mood normal.         Behavior: Behavior normal.         Lab Results   Component Value Date    WBC 9.83 10/12/2020    HGB 12.9 10/12/2020    HCT 38.4 10/12/2020     (L) 10/12/2020    CHOL 168 07/24/2020    TRIG 74 07/24/2020    HDL 45 07/24/2020    ALT 12 10/09/2020    AST 17 10/09/2020     10/12/2020    K 4.1 10/12/2020     10/12/2020    CREATININE 0.9 10/12/2020    BUN 14 10/12/2020    CO2 20 (L) 10/12/2020    TSH 2.557 07/24/2020    INR 1.0 03/03/2016    HGBA1C 5.4 07/24/2020       Assessment:     1. Annual physical exam    2. Hypothyroidism due to acquired atrophy of thyroid    3. Psoriasis      Plan:   Tanesha was seen today for annual exam,  medication refill and shoulder pain.    Diagnoses and all orders for this visit:    Annual physical exam  -     Comprehensive Metabolic Panel; Future  -     TSH; Future  -     Lipid Panel; Future  -     Sedimentation rate; Future  -     C-REACTIVE PROTEIN; Future    Hypothyroidism due to acquired atrophy of thyroid  -     TSH; Future  -     levothyroxine (SYNTHROID) 50 MCG tablet; Take 1 tablet (50 mcg total) by mouth once daily.    Psoriasis  -     Sedimentation rate; Future  -     C-REACTIVE PROTEIN; Future          No follow-ups on file.    Deepa Valera MD  Internal Medicine    8/10/2022

## 2022-08-11 ENCOUNTER — LAB VISIT (OUTPATIENT)
Dept: LAB | Facility: HOSPITAL | Age: 57
End: 2022-08-11
Attending: HOSPITALIST

## 2022-08-11 ENCOUNTER — OFFICE VISIT (OUTPATIENT)
Dept: INTERNAL MEDICINE | Facility: CLINIC | Age: 57
End: 2022-08-11

## 2022-08-11 VITALS
OXYGEN SATURATION: 97 % | WEIGHT: 179.44 LBS | HEIGHT: 63 IN | DIASTOLIC BLOOD PRESSURE: 78 MMHG | BODY MASS INDEX: 31.79 KG/M2 | TEMPERATURE: 98 F | SYSTOLIC BLOOD PRESSURE: 118 MMHG | HEART RATE: 70 BPM

## 2022-08-11 DIAGNOSIS — Z00.00 ANNUAL PHYSICAL EXAM: ICD-10-CM

## 2022-08-11 DIAGNOSIS — E03.4 HYPOTHYROIDISM DUE TO ACQUIRED ATROPHY OF THYROID: ICD-10-CM

## 2022-08-11 DIAGNOSIS — L40.9 PSORIASIS: ICD-10-CM

## 2022-08-11 DIAGNOSIS — Z00.00 ANNUAL PHYSICAL EXAM: Primary | ICD-10-CM

## 2022-08-11 LAB
ALBUMIN SERPL BCP-MCNC: 3.9 G/DL (ref 3.5–5.2)
ALP SERPL-CCNC: 111 U/L (ref 55–135)
ALT SERPL W/O P-5'-P-CCNC: 11 U/L (ref 10–44)
ANION GAP SERPL CALC-SCNC: 8 MMOL/L (ref 8–16)
AST SERPL-CCNC: 15 U/L (ref 10–40)
BILIRUB SERPL-MCNC: 0.7 MG/DL (ref 0.1–1)
BUN SERPL-MCNC: 16 MG/DL (ref 6–20)
CALCIUM SERPL-MCNC: 9 MG/DL (ref 8.7–10.5)
CHLORIDE SERPL-SCNC: 108 MMOL/L (ref 95–110)
CHOLEST SERPL-MCNC: 182 MG/DL (ref 120–199)
CHOLEST/HDLC SERPL: 3.7 {RATIO} (ref 2–5)
CO2 SERPL-SCNC: 25 MMOL/L (ref 23–29)
CREAT SERPL-MCNC: 0.8 MG/DL (ref 0.5–1.4)
CRP SERPL-MCNC: 5.9 MG/L (ref 0–8.2)
ERYTHROCYTE [SEDIMENTATION RATE] IN BLOOD BY PHOTOMETRIC METHOD: 10 MM/HR (ref 0–36)
EST. GFR  (NO RACE VARIABLE): >60 ML/MIN/1.73 M^2
GLUCOSE SERPL-MCNC: 88 MG/DL (ref 70–110)
HDLC SERPL-MCNC: 49 MG/DL (ref 40–75)
HDLC SERPL: 26.9 % (ref 20–50)
LDLC SERPL CALC-MCNC: 121.2 MG/DL (ref 63–159)
NONHDLC SERPL-MCNC: 133 MG/DL
POTASSIUM SERPL-SCNC: 4.2 MMOL/L (ref 3.5–5.1)
PROT SERPL-MCNC: 6.7 G/DL (ref 6–8.4)
SODIUM SERPL-SCNC: 141 MMOL/L (ref 136–145)
TRIGL SERPL-MCNC: 59 MG/DL (ref 30–150)
TSH SERPL DL<=0.005 MIU/L-ACNC: 2.3 UIU/ML (ref 0.4–4)

## 2022-08-11 PROCEDURE — 84443 ASSAY THYROID STIM HORMONE: CPT | Performed by: HOSPITALIST

## 2022-08-11 PROCEDURE — 80053 COMPREHEN METABOLIC PANEL: CPT | Performed by: HOSPITALIST

## 2022-08-11 PROCEDURE — 99999 PR PBB SHADOW E&M-EST. PATIENT-LVL III: ICD-10-PCS | Mod: PBBFAC,,, | Performed by: HOSPITALIST

## 2022-08-11 PROCEDURE — 80061 LIPID PANEL: CPT | Performed by: HOSPITALIST

## 2022-08-11 PROCEDURE — 86140 C-REACTIVE PROTEIN: CPT | Performed by: HOSPITALIST

## 2022-08-11 PROCEDURE — 99213 OFFICE O/P EST LOW 20 MIN: CPT | Mod: PBBFAC,PO | Performed by: HOSPITALIST

## 2022-08-11 PROCEDURE — 99396 PR PREVENTIVE VISIT,EST,40-64: ICD-10-PCS | Mod: S$PBB,,, | Performed by: HOSPITALIST

## 2022-08-11 PROCEDURE — 36415 COLL VENOUS BLD VENIPUNCTURE: CPT | Mod: PO | Performed by: HOSPITALIST

## 2022-08-11 PROCEDURE — 99396 PREV VISIT EST AGE 40-64: CPT | Mod: S$PBB,,, | Performed by: HOSPITALIST

## 2022-08-11 PROCEDURE — 99999 PR PBB SHADOW E&M-EST. PATIENT-LVL III: CPT | Mod: PBBFAC,,, | Performed by: HOSPITALIST

## 2022-08-11 PROCEDURE — 85652 RBC SED RATE AUTOMATED: CPT | Performed by: HOSPITALIST

## 2022-08-11 RX ORDER — LEVOTHYROXINE SODIUM 50 UG/1
50 TABLET ORAL DAILY
Qty: 90 TABLET | Refills: 3 | Status: SHIPPED | OUTPATIENT
Start: 2022-08-11 | End: 2023-08-22 | Stop reason: SDUPTHER

## 2023-08-17 DIAGNOSIS — E03.4 HYPOTHYROIDISM DUE TO ACQUIRED ATROPHY OF THYROID: ICD-10-CM

## 2023-08-17 RX ORDER — LEVOTHYROXINE SODIUM 50 UG/1
50 TABLET ORAL DAILY
Qty: 90 TABLET | Refills: 3 | Status: CANCELLED | OUTPATIENT
Start: 2023-08-17

## 2023-08-17 NOTE — TELEPHONE ENCOUNTER
Care Due:                  Date            Visit Type   Department     Provider  --------------------------------------------------------------------------------                                MYCHART                              ANNUAL                              CHECKUP/PHY  MET INTERNAL  Last Visit: 08-      [facility]       Deepa  Azfeliciano  Next Visit: None Scheduled  None         None Found                                                            Last  Test          Frequency    Reason                     Performed    Due Date  --------------------------------------------------------------------------------    Office Visit  12 months..  levothyroxine............  08- 08-    TSH.........  12 months..  levothyroxine............  08- 08-    [employer] Embedded Care Due Messages. Reference number: [id number].   8/17/2023 11:24:36 AM CDT

## 2023-08-21 NOTE — PROGRESS NOTES
Subjective:       Patient ID: Tanesha Burns is a 58 y.o. female.    Chief Complaint: Annual Wellness Visit      Tanesha Burns is a 58 y.o. female who presents today for an annual wellness visit.     She voices no concerns or complaints. Doing colonoscopy and MMG screening through [facility].     Review of patient's allergies indicates:  No Known Allergies   Medication List with Changes/Refills   Changed and/or Refilled Medications    Modified Medication Previous Medication    LEVOTHYROXINE (SYNTHROID) 50 MCG TABLET levothyroxine (SYNTHROID) 50 MCG tablet       Take 1 tablet (50 mcg total) by mouth once daily.    Take 1 tablet (50 mcg total) by mouth once daily.       Health Maintenance    MM2020  PAP: 2014  Colonoscopy: 2018  (due in )  Tetanus/Tdap: 2007      Patient ambulates on her own without an assistive device.     On average, how many days per week do you do moderate to strenuous exercise:  (like a brisk walk or jog; this does not include your job/work)  3-5   On average, how many minutes do you exercise at this level each day? 30  We encourage you to start exercising if you do not already, continue at your current level or increase your level of activity.     Do you eat fruits and vegetable every day?  Yes    Have you ever used tobacco products? No    Have you ever been screened for HIV? No  Would you like to be screened for HIV? No    Do you still have a menstrual cycle? No       If not,   Partial Hyst       For post-menopausal women:  Are you taking a daily supplement with both Vitamin D and Calcium? No  Do you have any issues with leaking of urine? No    Do you go to the dentist regularly? Yes  When was you last exam:     Do you go to the eye doctor regularly? No  When was your last exam:    Have you often been bothered by feeling down, depressed, or hopeless?  Not at all    Have you often been bothered by having little interest or pleasure in doing  "things?  Not at all       Review of Systems   Constitutional:  Negative for chills and fever.   Respiratory:  Negative for cough and shortness of breath.    Cardiovascular:  Negative for chest pain.   Neurological:  Negative for dizziness and headaches.       Objective:     /82 (BP Location: Right arm, Patient Position: Sitting, BP Method: Medium (Manual))   Pulse 72   Temp 98.5 °F (36.9 °C) (Temporal)   Resp 18   Ht 5' 3" (1.6 m)   Wt 82.2 kg (181 lb 3.5 oz)   LMP 03/29/2016   SpO2 98%   BMI 32.10 kg/m²     Physical Exam  Vitals reviewed.   Constitutional:       Appearance: Normal appearance.   HENT:      Head: Normocephalic and atraumatic.   Cardiovascular:      Rate and Rhythm: Normal rate and regular rhythm.      Heart sounds: Normal heart sounds. No murmur heard.  Pulmonary:      Effort: Pulmonary effort is normal.      Breath sounds: Normal breath sounds. No wheezing.   Skin:     General: Skin is warm and dry.   Neurological:      Mental Status: She is alert and oriented to person, place, and time.       BP Readings from Last 3 Encounters:   08/22/23 124/82   08/11/22 118/78   11/18/20 126/66     Wt Readings from Last 3 Encounters:   08/22/23 82.2 kg (181 lb 3.5 oz)   08/11/22 81.4 kg (179 lb 7.3 oz)   11/18/20 79.4 kg (175 lb 0.7 oz)     I reviewed and independently interpreted the labs and imaging below:  Last Labs:  Glucose   Date Value Ref Range Status   08/11/2022 88 70 - 110 mg/dL Final   10/12/2020 142 (H) 70 - 110 mg/dL Final     BUN   Date Value Ref Range Status   08/11/2022 16 6 - 20 mg/dL Final   10/12/2020 14 6 - 20 mg/dL Final     Creatinine   Date Value Ref Range Status   08/11/2022 0.8 0.5 - 1.4 mg/dL Final   10/12/2020 0.9 0.5 - 1.4 mg/dL Final     Cholesterol   Date Value Ref Range Status   08/11/2022 182 120 - 199 mg/dL Final     Comment:     The National Cholesterol Education Program (NCEP) has set the  following guidelines (reference ranges) for " "Cholesterol:  Optimal.....................<200 mg/dL  Borderline High.............200-239 mg/dL  High........................> or = 240 mg/dL     07/24/2020 168 120 - 199 mg/dL Final     Comment:     The National Cholesterol Education Program (NCEP) has set the  following guidelines (reference ranges) for Cholesterol:  Optimal.....................<200 mg/dL  Borderline High.............200-239 mg/dL  High........................> or = 240 mg/dL       Hemoglobin A1C   Date Value Ref Range Status   07/24/2020 5.4 4.0 - 5.6 % Final     Comment:     ADA Screening Guidelines:  5.7-6.4%  Consistent with prediabetes  >or=6.5%  Consistent with diabetes  High levels of fetal hemoglobin interfere with the HbA1C  assay. Heterozygous hemoglobin variants (HbS, HgC, etc)do  not significantly interfere with this assay.   However, presence of multiple variants may affect accuracy.     04/15/2019 5.2 4.0 - 5.6 % Final     Comment:     ADA Screening Guidelines:  5.7-6.4%  Consistent with prediabetes  >or=6.5%  Consistent with diabetes  High levels of fetal hemoglobin interfere with the HbA1C  assay. Heterozygous hemoglobin variants (HbS, HgC, etc)do  not significantly interfere with this assay.   However, presence of multiple variants may affect accuracy.       Hemoglobin   Date Value Ref Range Status   10/12/2020 12.9 12.0 - 16.0 g/dL Final   10/09/2020 13.9 12.0 - 16.0 g/dL Final     Hematocrit   Date Value Ref Range Status   10/12/2020 38.4 37.0 - 48.5 % Final   10/09/2020 42.1 37.0 - 48.5 % Final     No results found for: "XOGBQPAC43DT"    Assessment and Plan:     1. Encounter for annual health examination    --Nutrition: Discussed the importance of moderate caffeine intake. Adhering to a low sodium, low saturated fat/low cholesterol diet.   --Exercise: Discussed the importance of regular exercise. At least 30 minutes 5 days per week.   --Immunizations reviewed.  --Discussed benefits of maintaining up to date health " maintenance.    - TSH; Future  - COMPREHENSIVE METABOLIC PANEL; Future  - Lipid Panel; Future  - Hemoglobin A1C; Future    2. Hypothyroidism due to acquired atrophy of thyroid    Chronic, labs to assess stability, continue current medication at current dose.     - TSH; Future  - COMPREHENSIVE METABOLIC PANEL; Future  - Lipid Panel; Future  - levothyroxine (SYNTHROID) 50 MCG tablet; Take 1 tablet (50 mcg total) by mouth once daily.  Dispense: 90 tablet; Refill: 3    3. Psoriasis    Chronic, stable    4. Polyp of colon, unspecified part of colon, unspecified type     Last colonoscopy in 2018 - due this year - did FIT KIT which per patient was normal. Will provider results. Due at 63 years of age.

## 2023-08-22 ENCOUNTER — OFFICE VISIT (OUTPATIENT)
Dept: INTERNAL MEDICINE | Facility: CLINIC | Age: 58
End: 2023-08-22
Payer: COMMERCIAL

## 2023-08-22 VITALS
RESPIRATION RATE: 18 BRPM | OXYGEN SATURATION: 98 % | DIASTOLIC BLOOD PRESSURE: 82 MMHG | TEMPERATURE: 99 F | HEIGHT: 63 IN | SYSTOLIC BLOOD PRESSURE: 124 MMHG | WEIGHT: 181.19 LBS | HEART RATE: 72 BPM | BODY MASS INDEX: 32.11 KG/M2

## 2023-08-22 DIAGNOSIS — L40.9 PSORIASIS: ICD-10-CM

## 2023-08-22 DIAGNOSIS — K63.5 POLYP OF COLON, UNSPECIFIED PART OF COLON, UNSPECIFIED TYPE: ICD-10-CM

## 2023-08-22 DIAGNOSIS — Z00.00 ENCOUNTER FOR ANNUAL HEALTH EXAMINATION: Primary | ICD-10-CM

## 2023-08-22 DIAGNOSIS — E03.4 HYPOTHYROIDISM DUE TO ACQUIRED ATROPHY OF THYROID: ICD-10-CM

## 2023-08-22 PROCEDURE — 99396 PR PREVENTIVE VISIT,EST,40-64: ICD-10-PCS | Mod: S$GLB,,, | Performed by: NURSE PRACTITIONER

## 2023-08-22 PROCEDURE — 99396 PREV VISIT EST AGE 40-64: CPT | Mod: S$GLB,,, | Performed by: NURSE PRACTITIONER

## 2023-08-22 PROCEDURE — 99999 PR PBB SHADOW E&M-EST. PATIENT-LVL IV: ICD-10-PCS | Mod: PBBFAC,,, | Performed by: NURSE PRACTITIONER

## 2023-08-22 PROCEDURE — 99999 PR PBB SHADOW E&M-EST. PATIENT-LVL IV: CPT | Mod: PBBFAC,,, | Performed by: NURSE PRACTITIONER

## 2023-08-22 RX ORDER — LEVOTHYROXINE SODIUM 50 UG/1
50 TABLET ORAL DAILY
Qty: 90 TABLET | Refills: 3 | Status: SHIPPED | OUTPATIENT
Start: 2023-08-22

## 2023-08-23 ENCOUNTER — LAB VISIT (OUTPATIENT)
Dept: LAB | Facility: HOSPITAL | Age: 58
End: 2023-08-23

## 2023-08-23 DIAGNOSIS — Z00.00 ENCOUNTER FOR ANNUAL HEALTH EXAMINATION: ICD-10-CM

## 2023-08-23 DIAGNOSIS — E03.4 HYPOTHYROIDISM DUE TO ACQUIRED ATROPHY OF THYROID: ICD-10-CM

## 2023-08-23 LAB
ALBUMIN SERPL BCP-MCNC: 3.6 G/DL (ref 3.5–5.2)
ALP SERPL-CCNC: 100 U/L (ref 55–135)
ALT SERPL W/O P-5'-P-CCNC: 10 U/L (ref 10–44)
ANION GAP SERPL CALC-SCNC: 6 MMOL/L (ref 8–16)
AST SERPL-CCNC: 15 U/L (ref 10–40)
BILIRUB SERPL-MCNC: 0.6 MG/DL (ref 0.1–1)
BUN SERPL-MCNC: 11 MG/DL (ref 6–20)
CALCIUM SERPL-MCNC: 8.9 MG/DL (ref 8.7–10.5)
CHLORIDE SERPL-SCNC: 109 MMOL/L (ref 95–110)
CHOLEST SERPL-MCNC: 185 MG/DL (ref 120–199)
CHOLEST/HDLC SERPL: 4.1 {RATIO} (ref 2–5)
CO2 SERPL-SCNC: 25 MMOL/L (ref 23–29)
CREAT SERPL-MCNC: 0.8 MG/DL (ref 0.5–1.4)
EST. GFR  (NO RACE VARIABLE): >60 ML/MIN/1.73 M^2
ESTIMATED AVG GLUCOSE: 103 MG/DL (ref 68–131)
GLUCOSE SERPL-MCNC: 92 MG/DL (ref 70–110)
HBA1C MFR BLD: 5.2 % (ref 4–5.6)
HDLC SERPL-MCNC: 45 MG/DL (ref 40–75)
HDLC SERPL: 24.3 % (ref 20–50)
LDLC SERPL CALC-MCNC: 126.2 MG/DL (ref 63–159)
NONHDLC SERPL-MCNC: 140 MG/DL
POTASSIUM SERPL-SCNC: 4 MMOL/L (ref 3.5–5.1)
PROT SERPL-MCNC: 6.7 G/DL (ref 6–8.4)
SODIUM SERPL-SCNC: 140 MMOL/L (ref 136–145)
TRIGL SERPL-MCNC: 69 MG/DL (ref 30–150)
TSH SERPL DL<=0.005 MIU/L-ACNC: 2.63 UIU/ML (ref 0.4–4)

## 2023-08-23 PROCEDURE — 83036 HEMOGLOBIN GLYCOSYLATED A1C: CPT | Performed by: NURSE PRACTITIONER

## 2023-08-23 PROCEDURE — 84443 ASSAY THYROID STIM HORMONE: CPT | Performed by: NURSE PRACTITIONER

## 2023-08-23 PROCEDURE — 80061 LIPID PANEL: CPT | Performed by: NURSE PRACTITIONER

## 2023-08-23 PROCEDURE — 36415 COLL VENOUS BLD VENIPUNCTURE: CPT | Mod: PO | Performed by: NURSE PRACTITIONER

## 2023-08-23 PROCEDURE — 80053 COMPREHEN METABOLIC PANEL: CPT | Performed by: NURSE PRACTITIONER

## 2024-02-23 DIAGNOSIS — Z12.31 OTHER SCREENING MAMMOGRAM: ICD-10-CM

## 2024-06-07 ENCOUNTER — HOSPITAL ENCOUNTER (OUTPATIENT)
Dept: RADIOLOGY | Facility: HOSPITAL | Age: 59
Discharge: HOME | End: 2024-06-07
Attending: INTERNAL MEDICINE
Payer: COMMERCIAL

## 2024-06-07 VITALS — BODY MASS INDEX: 32.07 KG/M2 | HEIGHT: 63 IN | WEIGHT: 181 LBS

## 2024-06-07 DIAGNOSIS — Z12.31 OTHER SCREENING MAMMOGRAM: ICD-10-CM

## 2024-06-07 PROCEDURE — 77063 BREAST TOMOSYNTHESIS BI: CPT | Mod: 26,,, | Performed by: RADIOLOGY

## 2024-06-07 PROCEDURE — 77067 SCR MAMMO BI INCL CAD: CPT | Mod: TC

## 2024-06-07 PROCEDURE — 77067 SCR MAMMO BI INCL CAD: CPT | Mod: 26,,, | Performed by: RADIOLOGY

## 2024-08-16 DIAGNOSIS — E03.4 HYPOTHYROIDISM DUE TO ACQUIRED ATROPHY OF THYROID: ICD-10-CM

## 2024-08-16 RX ORDER — LEVOTHYROXINE SODIUM 50 UG/1
50 TABLET ORAL
Qty: 90 TABLET | Refills: 1 | Status: SHIPPED | OUTPATIENT
Start: 2024-08-16

## 2024-08-16 NOTE — TELEPHONE ENCOUNTER
Refill Routing Note   Medication(s) are not appropriate for processing by [facility] for the following reason(s):        Patient not seen by provider within 15 months  Clarification of medication (Rx) details    [facility] action(s):  Defer               Appointments  past 12m or future 3m with PCP    Date Provider   Last Visit   8/21/2020 Pedro Carrera, DO   Next Visit   Visit date not found Pedro Carrera, DO   ED visits in past 90 days: 0        Note composed:11:16 AM 08/16/2024

## 2024-08-16 NOTE — TELEPHONE ENCOUNTER
No care due was identified.  [facility] Embedded Care Due Messages. Reference number: [id number].   8/16/2024 11:16:34 AM CDT

## 2024-11-13 ENCOUNTER — LAB VISIT (OUTPATIENT)
Dept: LAB | Facility: HOSPITAL | Age: 59
End: 2024-11-13
Attending: NURSE PRACTITIONER
Payer: COMMERCIAL

## 2024-11-13 ENCOUNTER — OFFICE VISIT (OUTPATIENT)
Dept: INTERNAL MEDICINE | Facility: CLINIC | Age: 59
End: 2024-11-13
Payer: COMMERCIAL

## 2024-11-13 VITALS
RESPIRATION RATE: 16 BRPM | SYSTOLIC BLOOD PRESSURE: 112 MMHG | BODY MASS INDEX: 30.83 KG/M2 | HEIGHT: 63 IN | OXYGEN SATURATION: 97 % | WEIGHT: 174 LBS | TEMPERATURE: 98 F | DIASTOLIC BLOOD PRESSURE: 80 MMHG | HEART RATE: 69 BPM

## 2024-11-13 DIAGNOSIS — Z12.11 ENCOUNTER FOR COLORECTAL CANCER SCREENING: Primary | ICD-10-CM

## 2024-11-13 DIAGNOSIS — Z11.59 ENCOUNTER FOR HEPATITIS C SCREENING TEST FOR LOW RISK PATIENT: ICD-10-CM

## 2024-11-13 DIAGNOSIS — Z00.00 ENCOUNTER FOR ANNUAL HEALTH EXAMINATION: ICD-10-CM

## 2024-11-13 DIAGNOSIS — Z12.12 ENCOUNTER FOR COLORECTAL CANCER SCREENING: Primary | ICD-10-CM

## 2024-11-13 LAB
ALBUMIN SERPL BCP-MCNC: 3.7 G/DL (ref 3.5–5.2)
ALP SERPL-CCNC: 106 U/L (ref 40–150)
ALT SERPL W/O P-5'-P-CCNC: 15 U/L (ref 10–44)
ANION GAP SERPL CALC-SCNC: 6 MMOL/L (ref 8–16)
AST SERPL-CCNC: 19 U/L (ref 10–40)
BASOPHILS # BLD AUTO: 0.03 K/UL (ref 0–0.2)
BASOPHILS NFR BLD: 0.5 % (ref 0–1.9)
BILIRUB SERPL-MCNC: 0.4 MG/DL (ref 0.1–1)
BUN SERPL-MCNC: 17 MG/DL (ref 8–23)
CALCIUM SERPL-MCNC: 9.3 MG/DL (ref 8.7–10.5)
CHLORIDE SERPL-SCNC: 109 MMOL/L (ref 95–110)
CHOLEST SERPL-MCNC: 171 MG/DL (ref 120–199)
CHOLEST/HDLC SERPL: 3.4 {RATIO} (ref 2–5)
CO2 SERPL-SCNC: 25 MMOL/L (ref 23–29)
CREAT SERPL-MCNC: 0.8 MG/DL (ref 0.5–1.4)
DIFFERENTIAL METHOD BLD: ABNORMAL
EOSINOPHIL # BLD AUTO: 0.3 K/UL (ref 0–0.5)
EOSINOPHIL NFR BLD: 4.8 % (ref 0–8)
ERYTHROCYTE [DISTWIDTH] IN BLOOD BY AUTOMATED COUNT: 12.6 % (ref 11.5–14.5)
EST. GFR  (NO RACE VARIABLE): >60 ML/MIN/1.73 M^2
ESTIMATED AVG GLUCOSE: 105 MG/DL (ref 68–131)
GLUCOSE SERPL-MCNC: 96 MG/DL (ref 70–110)
HBA1C MFR BLD: 5.3 % (ref 4–5.6)
HCT VFR BLD AUTO: 40.9 % (ref 37–48.5)
HCV AB SERPL QL IA: NORMAL
HDLC SERPL-MCNC: 51 MG/DL (ref 40–75)
HDLC SERPL: 29.8 % (ref 20–50)
HGB BLD-MCNC: 13.2 G/DL (ref 12–16)
IMM GRANULOCYTES # BLD AUTO: 0.02 K/UL (ref 0–0.04)
IMM GRANULOCYTES NFR BLD AUTO: 0.3 % (ref 0–0.5)
LDLC SERPL CALC-MCNC: 106.8 MG/DL (ref 63–159)
LYMPHOCYTES # BLD AUTO: 1.5 K/UL (ref 1–4.8)
LYMPHOCYTES NFR BLD: 26.4 % (ref 18–48)
MCH RBC QN AUTO: 30.5 PG (ref 27–31)
MCHC RBC AUTO-ENTMCNC: 32.3 G/DL (ref 32–36)
MCV RBC AUTO: 95 FL (ref 82–98)
MONOCYTES # BLD AUTO: 0.5 K/UL (ref 0.3–1)
MONOCYTES NFR BLD: 8.7 % (ref 4–15)
NEUTROPHILS # BLD AUTO: 3.5 K/UL (ref 1.8–7.7)
NEUTROPHILS NFR BLD: 59.3 % (ref 38–73)
NONHDLC SERPL-MCNC: 120 MG/DL
NRBC BLD-RTO: 0 /100 WBC
PLATELET # BLD AUTO: 148 K/UL (ref 150–450)
PMV BLD AUTO: 11.9 FL (ref 9.2–12.9)
POTASSIUM SERPL-SCNC: 4.3 MMOL/L (ref 3.5–5.1)
PROT SERPL-MCNC: 6.9 G/DL (ref 6–8.4)
RBC # BLD AUTO: 4.33 M/UL (ref 4–5.4)
SODIUM SERPL-SCNC: 140 MMOL/L (ref 136–145)
TRIGL SERPL-MCNC: 66 MG/DL (ref 30–150)
TSH SERPL DL<=0.005 MIU/L-ACNC: 3.25 UIU/ML (ref 0.4–4)
WBC # BLD AUTO: 5.83 K/UL (ref 3.9–12.7)

## 2024-11-13 PROCEDURE — 80061 LIPID PANEL: CPT | Performed by: NURSE PRACTITIONER

## 2024-11-13 PROCEDURE — 84443 ASSAY THYROID STIM HORMONE: CPT | Performed by: NURSE PRACTITIONER

## 2024-11-13 PROCEDURE — 80053 COMPREHEN METABOLIC PANEL: CPT | Performed by: NURSE PRACTITIONER

## 2024-11-13 PROCEDURE — 86803 HEPATITIS C AB TEST: CPT | Performed by: NURSE PRACTITIONER

## 2024-11-13 PROCEDURE — 83036 HEMOGLOBIN GLYCOSYLATED A1C: CPT | Performed by: NURSE PRACTITIONER

## 2024-11-13 PROCEDURE — 99999 PR PBB SHADOW E&M-EST. PATIENT-LVL IV: CPT | Mod: PBBFAC,,, | Performed by: NURSE PRACTITIONER

## 2024-11-13 PROCEDURE — 85025 COMPLETE CBC W/AUTO DIFF WBC: CPT | Performed by: NURSE PRACTITIONER

## 2024-11-13 NOTE — PROGRESS NOTES
Subjective:       Patient ID: Tanesha Burns is a 59 y.o. female.    Chief Complaint: Annual Exam    History of Present Illness    CHIEF COMPLAINT:  Annual wellness visit, request for thyroid medication renewal lab work, and colonoscopy scheduling.    HPI:  59-year-old female (nearly 60) presents for annual check-up. Patient reports no current health concerns. She has a history of thyroid medication (levothyroxine) use and requests lab work for prescription renewal. Patient seeks to schedule a colonoscopy before year-end due to impending health insurance changes. Her last colonoscopy was performed at age 53, approximately 6-7 years ago, which is 1.5 to 2 years overdue based on her personal 5-year screening schedule. The previous colonoscopy revealed a 3-millimeter polyp, leading to a 5-year follow-up recommendation. Patient's father's death from colon cancer motivates her to maintain regular screenings every 5 years.    MEDICATIONS:  Patient is on Levothyroxine for her thyroid condition.    MEDICAL HISTORY:  Patient has a history of hypothyroidism.    FAMILY HISTORY:  Family history is significant for her father who is  due to colon cancer.    SURGICAL HISTORY:  Patient underwent a colonoscopy approximately 6-7 years ago at the age of 53. During the procedure, a 3 mm polyp was found.    IMAGING:  She recently completed a mammogram.      ROS:  Respiratory: -cough       Objective:      Physical Exam  Vitals reviewed.   Constitutional:       Appearance: Normal appearance.   HENT:      Head: Normocephalic and atraumatic.   Eyes:      Conjunctiva/sclera: Conjunctivae normal.   Cardiovascular:      Rate and Rhythm: Normal rate and regular rhythm.   Pulmonary:      Effort: Pulmonary effort is normal.      Breath sounds: Normal breath sounds.   Abdominal:      General: Bowel sounds are normal.      Palpations: Abdomen is soft.   Musculoskeletal:         General: Normal range of motion.      Cervical back: Normal  range of motion and neck supple.   Skin:     General: Skin is warm.   Neurological:      General: No focal deficit present.   Psychiatric:         Mood and Affect: Mood normal.         Assessment:       1. Encounter for colorectal cancer screening  - Ambulatory referral/consult to Endo Procedure ; Future    2. Encounter for annual health examination  - CBC auto differential; Future  - Comprehensive metabolic panel; Future  - Hemoglobin A1c; Future  - Lipid panel; Future  - TSH; Future    3. Encounter for hepatitis C screening test for low risk patient  - HEPATITIS C ANTIBODY; Future      Plan:       Assessment & Plan    IMPRESSION:  Routine annual visit with no acute concerns  Recommend colonoscopy due to previous 3mm polyp 5 years ago  Recommend hepatitis C screening as part of routine preventive care  Reviewed thyroid medication, awaiting lab results to determine if adjustment needed    MEDICATIONS:  - Continued levothyroxine at current dose  - Refilled levothyroxine for 30 days    ORDERS:  - Labs ordered: thyroid panel, cholesterol, hemoglobin A1C, metabolic panel, kidney-liver function, blood count, hepatitis C screening  - Colonoscopy ordered    FOLLOW UP:  - Follow up after lab results are received for potential thyroid medication adjustment  - Contact the office within 72 hours if not contacted by colonoscopy scheduling  - Contact the office when lab results are available, expected by next day         This note was generated with the assistance of ambient listening technology. Verbal consent was obtained by the patient and accompanying visitor(s) for the recording of patient appointment to facilitate this note. I attest to having reviewed and edited the generated note for accuracy, though some syntax or spelling errors may persist. Please contact the author of this note for any clarification.

## 2024-11-14 ENCOUNTER — TELEPHONE (OUTPATIENT)
Dept: ENDOSCOPY | Facility: HOSPITAL | Age: 59
End: 2024-11-14
Payer: COMMERCIAL

## 2024-11-14 VITALS — BODY MASS INDEX: 30.65 KG/M2 | HEIGHT: 63 IN | WEIGHT: 173 LBS

## 2024-11-14 DIAGNOSIS — Z12.11 ENCOUNTER FOR SCREENING COLONOSCOPY: Primary | ICD-10-CM

## 2024-11-14 DIAGNOSIS — Z12.11 SCREENING FOR COLON CANCER: Primary | ICD-10-CM

## 2024-11-14 NOTE — TELEPHONE ENCOUNTER
Referral for procedure from [facility]      Spoke to patient to schedule procedure(s) Colonoscopy       Physician to perform procedure(s) Dr. Physician Colon  Date of Procedure (s) 11/25/2024  Arrival Time 12:00 Pm   Time of Procedure(s) 1:00 PM    Location of Procedure(s) [place] [address] Floor  Type of Rx Prep sent to patient: PEG  Instructions provided to patient via [facility]    Patient was informed on the following information and verbalized understanding. Screening questionnaire reviewed with patient and complete. If procedure requires anesthesia, a responsible adult needs to be present to accompany the patient home, patient cannot drive after receiving anesthesia. Appointment details are tentative, especially check-in time. Patient will receive a prep-op call 7 days prior to confirm check-in time for procedure. If applicable the patient should contact their pharmacy to verify Rx for procedure prep is ready for pick-up. Patient was advised to call the scheduling department at [phone] if pharmacy states no Rx is available. Patient was advised to call the endoscopy scheduling department if any questions or concerns arise.       Endoscopy Scheduling Department

## 2024-11-19 ENCOUNTER — ANESTHESIA EVENT (OUTPATIENT)
Dept: ENDOSCOPY | Facility: HOSPITAL | Age: 59
End: 2024-11-19
Payer: COMMERCIAL

## 2024-11-19 NOTE — ANESTHESIA PREPROCEDURE EVALUATION
11/19/2024  Tanesha Burns is a 59 y.o., female.      Pre-op Assessment    I have reviewed the Patient Summary Reports.     I have reviewed the Nursing Notes. I have reviewed the NPO Status.   I have reviewed the Medications.     Review of Systems  Anesthesia Hx:  No problems with previous Anesthesia   History of prior surgery of interest to airway management or planning:  Previous anesthesia: General Robotic sacrcolpopexy; sling; cystoscopy with general anesthesia.  Procedure performed at an [facility].      Airway issues documented on chart review include mask, easy, videolaryngoscope used, GETA     Denies Family Hx of Anesthesia complications.    Denies Personal Hx of Anesthesia complications.                    Social:  Non-Smoker, No Alcohol Use       Hematology/Oncology:  Hematology Normal   Oncology Normal                                   EENT/Dental:  EENT/Dental Normal           Cardiovascular:  Cardiovascular Normal Exercise tolerance: good                                             Pulmonary:  Pulmonary Normal                       Renal/:  Renal/ Normal                 Hepatic/GI:  Hepatic/GI Normal                    Musculoskeletal:  Musculoskeletal Normal                Neurological:  Neurology Normal                                      Endocrine:   Hypothyroidism          Dermatological:  Skin Normal    Psych:  Psychiatric Normal                  Physical Exam  General: Well nourished, Cooperative, Alert and Oriented    Airway:  Mallampati: II / II  Mouth Opening: Normal  TM Distance: Normal  Tongue: Normal  Neck ROM: Normal ROM    Dental:  Intact      Anesthesia Plan  Type of Anesthesia, risks & benefits discussed:    Anesthesia Type: Gen Natural Airway  Intra-op Monitoring Plan: Standard ASA Monitors  Post Op Pain Control Plan: multimodal analgesia and IV/PO Opioids  PRN  Induction:  IV  Informed Consent: Informed consent signed with the Patient and all parties understand the risks and agree with anesthesia plan.  All questions answered. Patient consented to blood products? No  ASA Score: 2  Day of Surgery Review of History & Physical: H&P Update referred to the surgeon/provider.    Ready For Surgery From Anesthesia Perspective.     .

## 2024-11-21 ENCOUNTER — CLINICAL SUPPORT (OUTPATIENT)
Dept: ENDOSCOPY | Facility: HOSPITAL | Age: 59
End: 2024-11-21
Payer: COMMERCIAL

## 2024-11-21 VITALS — HEIGHT: 63 IN | BODY MASS INDEX: 30.48 KG/M2 | WEIGHT: 172 LBS

## 2024-11-21 DIAGNOSIS — Z12.11 ENCOUNTER FOR COLORECTAL CANCER SCREENING: ICD-10-CM

## 2024-11-21 DIAGNOSIS — Z12.12 ENCOUNTER FOR COLORECTAL CANCER SCREENING: ICD-10-CM

## 2024-11-21 NOTE — H&P
Short Stay Endoscopy History and Physical    PCP - Pedro Carrera DO  Referring Physician - Pedro Carrera DO  [address] [facility]  [place] [id number]    Procedure - Colonoscopy  ASA - per anesthesia  Mallampati - per anesthesia  History of Anesthesia problems - no  Family history Anesthesia problems -  no   Plan of anesthesia - General    HPI  59 y.o. female  Reason for procedure:   Screening for colon cancer [Z12.11]  Family history of colon cancer [Z80.0]    One polyp 2018     ROS:  Constitutional: No fevers, chills, No weight loss  CV: No chest pain  Pulm: No cough, No shortness of breath  GI: see HPI    Medical History:  has a past medical history of Abnormal Pap smear (1983), Colon polyps, Encounter for blood transfusion, History of abnormal Pap smear, Hypothyroid, and Psoriasis.    Surgical History:  has a past surgical history that includes Temporomandibular joint surgery; Bunionectomy; Retinal laser procedure; Tubal ligation (2/09); Cryotherapy; Forearm fracture surgery; Wrist surgery (Left); Hysterectomy; Colonoscopy (N/A, 12/7/2018); Robot-assisted laparoscopic abdominal sacrocolpopexy using da Dinesh Xi (Bilateral, 10/12/2020); and Insertion of midurethral sling (N/A, 10/12/2020).    Family History: family history includes Breast cancer (age of onset: 90) in her maternal grandmother; Cancer (age of onset: 65) in her father; Colon cancer in her father; Coronary artery disease (age of onset: 60) in her father; Diabetes in her maternal grandfather; Hypertension in her father; Liver cancer in her father; Prostate cancer in her father..    Social History:  reports that she has never smoked. She has never used smokeless tobacco. She reports that she does not drink alcohol and does not use drugs.    Review of patient's allergies indicates:  No Known Allergies    Medications:   Medications Prior to Admission   Medication Sig Dispense Refill Last Dose/Taking    levothyroxine  (SYNTHROID) 50 MCG tablet TAKE 1 TABLET BY MOUTH EVERY DAY 90 tablet 1        Physical Exam:    Vital Signs: There were no vitals filed for this visit.    General Appearance: Well appearing in no acute distress  Abdomen: Soft, non tender, non distended with normal bowel sounds, no masses    Labs:  Lab Results   Component Value Date    WBC 5.83 11/13/2024    HGB 13.2 11/13/2024    HCT 40.9 11/13/2024     (L) 11/13/2024    CHOL 171 11/13/2024    TRIG 66 11/13/2024    HDL 51 11/13/2024    ALT 15 11/13/2024    AST 19 11/13/2024     11/13/2024    K 4.3 11/13/2024     11/13/2024    CREATININE 0.8 11/13/2024    BUN 17 11/13/2024    CO2 25 11/13/2024    TSH 3.253 11/13/2024    INR 1.0 03/03/2016    HGBA1C 5.3 11/13/2024       I have explained the risks and benefits of this endoscopic procedure to the patient including but not limited to bleeding, inflammation, infection, perforation, and death.    Assessment/Plan:     CRC Surveillance, FH of CRC     - Proceed with colonoscopy     Sonia Vela MD  Gastroenterology   [facility]

## 2024-11-25 ENCOUNTER — ANESTHESIA (OUTPATIENT)
Dept: ENDOSCOPY | Facility: HOSPITAL | Age: 59
End: 2024-11-25
Payer: COMMERCIAL

## 2024-11-25 ENCOUNTER — HOSPITAL ENCOUNTER (OUTPATIENT)
Facility: HOSPITAL | Age: 59
Discharge: HOME | End: 2024-11-25
Attending: STUDENT IN AN ORGANIZED HEALTH CARE EDUCATION/TRAINING PROGRAM | Admitting: STUDENT IN AN ORGANIZED HEALTH CARE EDUCATION/TRAINING PROGRAM
Payer: COMMERCIAL

## 2024-11-25 VITALS
DIASTOLIC BLOOD PRESSURE: 73 MMHG | HEART RATE: 83 BPM | SYSTOLIC BLOOD PRESSURE: 119 MMHG | TEMPERATURE: 98 F | BODY MASS INDEX: 30.48 KG/M2 | OXYGEN SATURATION: 100 % | RESPIRATION RATE: 16 BRPM | HEIGHT: 63 IN | WEIGHT: 172 LBS

## 2024-11-25 DIAGNOSIS — Z12.11 COLON CANCER SCREENING: Primary | ICD-10-CM

## 2024-11-25 PROCEDURE — 27201089 HC SNARE, DISP (ANY): Performed by: STUDENT IN AN ORGANIZED HEALTH CARE EDUCATION/TRAINING PROGRAM

## 2024-11-25 PROCEDURE — 25000003 PHARM REV CODE 250: Performed by: NURSE ANESTHETIST, CERTIFIED REGISTERED

## 2024-11-25 PROCEDURE — D9220A PRA ANESTHESIA: Mod: ,,, | Performed by: NURSE ANESTHETIST, CERTIFIED REGISTERED

## 2024-11-25 PROCEDURE — 45385 COLONOSCOPY W/LESION REMOVAL: CPT | Mod: 33 | Performed by: STUDENT IN AN ORGANIZED HEALTH CARE EDUCATION/TRAINING PROGRAM

## 2024-11-25 PROCEDURE — 88305 TISSUE EXAM BY PATHOLOGIST: CPT | Performed by: PATHOLOGY

## 2024-11-25 PROCEDURE — 45385 COLONOSCOPY W/LESION REMOVAL: CPT | Mod: 33,,, | Performed by: STUDENT IN AN ORGANIZED HEALTH CARE EDUCATION/TRAINING PROGRAM

## 2024-11-25 PROCEDURE — 63600175 PHARM REV CODE 636 W HCPCS: Performed by: NURSE ANESTHETIST, CERTIFIED REGISTERED

## 2024-11-25 PROCEDURE — 99900035 HC TECH TIME PER 15 MIN (STAT)

## 2024-11-25 PROCEDURE — 37000009 HC ANESTHESIA EA ADD 15 MINS: Performed by: STUDENT IN AN ORGANIZED HEALTH CARE EDUCATION/TRAINING PROGRAM

## 2024-11-25 PROCEDURE — 37000008 HC ANESTHESIA 1ST 15 MINUTES: Performed by: STUDENT IN AN ORGANIZED HEALTH CARE EDUCATION/TRAINING PROGRAM

## 2024-11-25 PROCEDURE — 27200997: Performed by: STUDENT IN AN ORGANIZED HEALTH CARE EDUCATION/TRAINING PROGRAM

## 2024-11-25 PROCEDURE — A4216 STERILE WATER/SALINE, 10 ML: HCPCS | Performed by: NURSE ANESTHETIST, CERTIFIED REGISTERED

## 2024-11-25 PROCEDURE — 94761 N-INVAS EAR/PLS OXIMETRY MLT: CPT

## 2024-11-25 RX ORDER — PROPOFOL 10 MG/ML
VIAL (ML) INTRAVENOUS
Status: DISCONTINUED | OUTPATIENT
Start: 2024-11-25 | End: 2024-11-25

## 2024-11-25 RX ORDER — SODIUM CHLORIDE 0.9 % (FLUSH) 0.9 %
SYRINGE (ML) INJECTION
Status: DISCONTINUED | OUTPATIENT
Start: 2024-11-25 | End: 2024-11-25

## 2024-11-25 RX ORDER — LIDOCAINE HYDROCHLORIDE 20 MG/ML
INJECTION INTRAVENOUS
Status: DISCONTINUED | OUTPATIENT
Start: 2024-11-25 | End: 2024-11-25

## 2024-11-25 RX ORDER — SODIUM CHLORIDE 9 MG/ML
INJECTION, SOLUTION INTRAVENOUS CONTINUOUS
Status: DISCONTINUED | OUTPATIENT
Start: 2024-11-25 | End: 2024-11-25 | Stop reason: HOSPADM

## 2024-11-25 RX ORDER — PROPOFOL 10 MG/ML
VIAL (ML) INTRAVENOUS CONTINUOUS PRN
Status: DISCONTINUED | OUTPATIENT
Start: 2024-11-25 | End: 2024-11-25

## 2024-11-25 RX ADMIN — GLYCOPYRROLATE 0.05 MG: 0.2 INJECTION, SOLUTION INTRAMUSCULAR; INTRAVENOUS at 01:11

## 2024-11-25 RX ADMIN — PROPOFOL 175 MCG/KG/MIN: 10 INJECTION, EMULSION INTRAVENOUS at 01:11

## 2024-11-25 RX ADMIN — PROPOFOL 100 MG: 10 INJECTION, EMULSION INTRAVENOUS at 01:11

## 2024-11-25 RX ADMIN — LIDOCAINE HYDROCHLORIDE 60 MG: 20 INJECTION INTRAVENOUS at 01:11

## 2024-11-25 RX ADMIN — Medication 10 ML: at 01:11

## 2024-11-25 NOTE — ANESTHESIA POSTPROCEDURE EVALUATION
Anesthesia Post Evaluation    Patient: Tanesha Burns    Procedure(s) Performed: Procedure(s) (LRB):  COLONOSCOPY, SCREENING, HIGH RISK PATIENT (N/A)    Final Anesthesia Type: general      Patient location during evaluation: PACU  Patient participation: Yes- Able to Participate  Level of consciousness: awake and alert  Post-procedure vital signs: reviewed and stable  Pain management: adequate  Airway patency: patent    PONV status at discharge: No PONV  Anesthetic complications: no      Cardiovascular status: stable  Respiratory status: spontaneous ventilation  Hydration status: euvolemic  Follow-up not needed.          Vitals Value Taken Time   /73 11/25/24 1403   Temp 36.4 °C (97.5 °F) 11/25/24 1342   Pulse 83 11/25/24 1403   Resp 16 11/25/24 1403   SpO2 100 % 11/25/24 1403         Event Time   Out of Recovery 13:57:00         Pain/Lanny Score: Lanny Score: 10 (11/25/2024  1:57 PM)

## 2024-11-25 NOTE — PROVATION PATIENT INSTRUCTIONS
Discharge Summary/Instructions after an Endoscopic Procedure  Patient Name: Tanesha Burns  Patient MRN: [id number]  Patient YOB: 1965  Monday, November 25, 2024  Sonia Vela MD  Dear patient,  As a result of recent federal legislation (The Federal Cures Act), you may   receive lab or pathology results from your procedure in your [facility]   account before your physician is able to contact you. Your physician or   their representative will relay the results to you with their   recommendations at their soonest availability.  Thank you,  RESTRICTIONS:  During your procedure today, you received medications for sedation.  These   medications may affect your judgment, balance and coordination.  Therefore,   for 24 hours, you have the following restrictions:   - DO NOT drive a car, operate machinery, make legal/financial decisions,   sign important papers or drink alcohol.    ACTIVITY:  Today: no heavy lifting, straining or running due to procedural   sedation/anesthesia.  The following day: return to full activity including work.  DIET:  Eat and drink normally unless instructed otherwise.     TREATMENT FOR COMMON SIDE EFFECTS:  - Mild abdominal pain, nausea, belching, bloating or excessive gas:  rest,   eat lightly and use a heating pad.  - Sore Throat: treat with throat lozenges and/or gargle with warm salt   water.  - Because air was used during the procedure, expelling large amounts of air   from your rectum or belching is normal.  - If a bowel prep was taken, you may not have a bowel movement for 1-3 days.    This is normal.  SYMPTOMS TO WATCH FOR AND REPORT TO YOUR PHYSICIAN:  1. Abdominal pain or bloating, other than gas cramps.  2. Chest pain.  3. Back pain.  4. Signs of infection such as: chills or fever occurring within 24 hours   after the procedure.  5. Rectal bleeding, which would show as bright red, maroon, or black stools.   (A tablespoon of blood from the rectum is not serious, especially  if   hemorrhoids are present.)  6. Vomiting.  7. Weakness or dizziness.  GO DIRECTLY TO THE NEAREST EMERGENCY ROOM IF YOU HAVE ANY OF THE FOLLOWING:      Difficulty breathing              Chills and/or fever over 101 F   Persistent vomiting and/or vomiting blood   Severe abdominal pain   Severe chest pain   Black, tarry stools   Bleeding- more than one tablespoon   Any other symptom or condition that you feel may need urgent attention  Your doctor recommends these additional instructions:  If any biopsies were taken, your doctors clinic will contact you in 1 to 2   weeks with any results.  - Discharge patient to home (ambulatory).   - Resume regular diet.   - Continue present medications.   - Await pathology results.   - Repeat colonoscopy in 3 years for surveillance.  For questions, problems or results please call your physician - Sonia Vela MD at Work:  [phone].  [facility], EMERGENCY ROOM PHONE NUMBER: [phone]  IF A COMPLICATION OR EMERGENCY SITUATION ARISES AND YOU ARE UNABLE TO REACH   YOUR PHYSICIAN - GO DIRECTLY TO THE EMERGENCY ROOM.  Sonia Vela MD  11/25/2024 1:39:36 PM  This report has been verified and signed electronically.  Dear patient,  As a result of recent federal legislation (The Federal Cures Act), you may   receive lab or pathology results from your procedure in your [facility]   account before your physician is able to contact you. Your physician or   their representative will relay the results to you with their   recommendations at their soonest availability.  Thank you,  PROVATION

## 2024-11-25 NOTE — PLAN OF CARE
Pt arrived to recovery dosc via stretcher per ENDO team. Bedside report received. Pt attached to bedside monitor. VSS. Pt resting without distress post procedure. Pt on room air; oxygen sats 100%. Pt IV access clean, dry and intact, saline locked. Safety maintained.

## 2024-11-25 NOTE — TRANSFER OF CARE
"Anesthesia Transfer of Care Note    Patient: Tanesha Burns    Procedure(s) Performed: Procedure(s) (LRB):  COLONOSCOPY, SCREENING, HIGH RISK PATIENT (N/A)    Patient location: PACU    Anesthesia Type: general    Transport from OR: Transported from OR on 6-10 L/min O2 by face mask with adequate spontaneous ventilation    Post pain: adequate analgesia    Post vital signs: stable    Level of consciousness: sedated    Nausea/Vomiting: no nausea/vomiting    Complications: none    Transfer of care protocol was followed      Last vitals: Visit Vitals  /66 (BP Location: Left arm, Patient Position: Lying)   Pulse 77   Temp 36.6 °C (97.9 °F) (Temporal)   Resp 16   Ht 5' 3" (1.6 m)   Wt 78 kg (172 lb)   LMP 03/29/2016   BMI 30.47 kg/m²     "

## 2024-11-27 ENCOUNTER — PATIENT MESSAGE (OUTPATIENT)
Dept: GASTROENTEROLOGY | Facility: CLINIC | Age: 59
End: 2024-11-27
Payer: COMMERCIAL

## 2024-11-27 LAB
FINAL PATHOLOGIC DIAGNOSIS: NORMAL
GROSS: NORMAL
Lab: NORMAL

## 2025-02-09 DIAGNOSIS — E03.4 HYPOTHYROIDISM DUE TO ACQUIRED ATROPHY OF THYROID: ICD-10-CM

## 2025-02-09 NOTE — TELEPHONE ENCOUNTER
Care Due:                  Date            Visit Type   Department     Provider  --------------------------------------------------------------------------------    Last Visit: None Found      None         None Found  Next Visit: None Scheduled  None         None Found                                                            Last  Test          Frequency    Reason                     Performed    Due Date  --------------------------------------------------------------------------------    Office Visit  15 months..  levothyroxine............  Not Found    Overdue    [employer] Embedded Care Due Messages. Reference number: [id number].   2/09/2025 7:11:47 AM CST

## 2025-02-10 RX ORDER — LEVOTHYROXINE SODIUM 50 UG/1
50 TABLET ORAL
Qty: 90 TABLET | Refills: 3 | Status: SHIPPED | OUTPATIENT
Start: 2025-02-10

## 2025-02-10 NOTE — TELEPHONE ENCOUNTER
Refill Routing Note   Medication(s) are not appropriate for processing by [facility] for the following reason(s):        Patient not seen by provider within 15 months    [facility] action(s):  Defer   Requires appointment : Yes               Appointments  past 12m or future 3m with PCP    Date Provider   Last Visit   8/21/2020 Pedro Carrera, DO   Next Visit   Visit date not found Pedro Carrera, DO   ED visits in past 90 days: 0        Note composed:8:38 AM 02/10/2025

## (undated) DEVICE — IRRIGATOR ENDOSCOPY DISP.

## (undated) DEVICE — SET TRI-LUMEN FILTERED TUBE

## (undated) DEVICE — JELLY SURGILUBE 5GR

## (undated) DEVICE — PAD PREP 50/CA

## (undated) DEVICE — SUT VICRYL 0 27 CT-2

## (undated) DEVICE — UNDERGLOVES BIOGEL PI SZ 7 LF

## (undated) DEVICE — DRESSING LEUKOPLAST FLEX 1X3IN

## (undated) DEVICE — SOL STRL WATER INJ 1000ML BG

## (undated) DEVICE — TIP SACROCOLPOPEXY 1.5X4.5IN

## (undated) DEVICE — SUT VICRYL CTD 2-0 GI 27 SH

## (undated) DEVICE — SUT PDS II O CT-2 VIL MONO

## (undated) DEVICE — CLOSURE SKIN STERI STRIP 1/2X4

## (undated) DEVICE — BLADE SURG STAINLESS STEEL #15

## (undated) DEVICE — SOL NS 1000CC

## (undated) DEVICE — SET CYSTO IRRIGATION UNIV SPIK

## (undated) DEVICE — DRAPE STERI INSTRUMENT 1018

## (undated) DEVICE — CATH FOL IC 3WAY 16F 5CCLF

## (undated) DEVICE — SEE MEDLINE ITEM 152622

## (undated) DEVICE — SOL 9P NACL IRR PIC IL

## (undated) DEVICE — TIP SACROCOLPOPEXY SM 1.3X3.5

## (undated) DEVICE — SEE MEDLINE ITEM 146347

## (undated) DEVICE — DECANTER VIAL ASEPTIC TRANSFER

## (undated) DEVICE — NDL INSUF ULTRA VERESS 120MM

## (undated) DEVICE — ELECTRODE REM PLYHSV RETURN 9

## (undated) DEVICE — SEE MEDLINE ITEM 156923

## (undated) DEVICE — SOL ELECTROLUBE ANTI-STIC

## (undated) DEVICE — PORT ACCESS 8MM W/100MM LOW

## (undated) DEVICE — DRAPE ARM DAVINCI XI

## (undated) DEVICE — GLOVE BIOGEL SKINSENSE PI 6.5

## (undated) DEVICE — CONTAINER SPECIMEN STRL 4OZ

## (undated) DEVICE — KIT SURGIFLO HEMOSTATIC MATRIX

## (undated) DEVICE — PAD ABD 8X10 STERILE

## (undated) DEVICE — SUT PROLENE 0 MO6 30IN BLUE

## (undated) DEVICE — KIT WING PAD POSITIONING

## (undated) DEVICE — SEE MEDLINE ITEM 156930

## (undated) DEVICE — SYR 10CC LUER LOCK

## (undated) DEVICE — ADHESIVE DERMABOND ADVANCED

## (undated) DEVICE — SUT 0 V-LOC GR GS-21 TAPER

## (undated) DEVICE — GLOVE BIOGEL SKINSENSE PI 7.0

## (undated) DEVICE — UNDERGLOVE BIOGEL PI SZ 6.5 LF

## (undated) DEVICE — SEAL UNIVERSAL 5MM-8MM XI

## (undated) DEVICE — BANDAGE GAUZE 6PLY FLUFF 2X3

## (undated) DEVICE — CLIPPER BLADE MOD 4406 (CAREF)

## (undated) DEVICE — APPLICATOR ENDOSCOPIC

## (undated) DEVICE — SUT MCRYL PLUS 4-0 PS2 27IN

## (undated) DEVICE — DRAPE COLUMN DAVINCI XI

## (undated) DEVICE — NDL HYPO REG 25G X 1 1/2

## (undated) DEVICE — COVER TIP CURVED SCISSORS XI

## (undated) DEVICE — SOL CLEARIFY VISUALIZATION LAP